# Patient Record
Sex: FEMALE | Race: BLACK OR AFRICAN AMERICAN | Employment: FULL TIME | URBAN - METROPOLITAN AREA
[De-identification: names, ages, dates, MRNs, and addresses within clinical notes are randomized per-mention and may not be internally consistent; named-entity substitution may affect disease eponyms.]

---

## 2018-08-16 ENCOUNTER — HOSPITAL ENCOUNTER (EMERGENCY)
Age: 38
Discharge: HOME | End: 2018-08-16
Attending: EMERGENCY MEDICINE
Payer: MEDICAID

## 2018-08-16 ENCOUNTER — APPOINTMENT (OUTPATIENT)
Dept: ULTRASOUND IMAGING | Age: 38
End: 2018-08-16
Payer: MEDICAID

## 2018-08-16 ENCOUNTER — APPOINTMENT (OUTPATIENT)
Dept: CT IMAGING | Age: 38
End: 2018-08-16
Payer: MEDICAID

## 2018-08-16 ENCOUNTER — APPOINTMENT (OUTPATIENT)
Dept: GENERAL RADIOLOGY | Age: 38
End: 2018-08-16
Payer: MEDICAID

## 2018-08-16 VITALS
TEMPERATURE: 98.6 F | HEIGHT: 66 IN | HEART RATE: 73 BPM | DIASTOLIC BLOOD PRESSURE: 84 MMHG | RESPIRATION RATE: 14 BRPM | OXYGEN SATURATION: 98 % | BODY MASS INDEX: 37.45 KG/M2 | WEIGHT: 233 LBS | SYSTOLIC BLOOD PRESSURE: 132 MMHG

## 2018-08-16 DIAGNOSIS — R60.9 PERIPHERAL EDEMA: ICD-10-CM

## 2018-08-16 DIAGNOSIS — M79.89 LEG SWELLING: Primary | ICD-10-CM

## 2018-08-16 LAB
ALBUMIN SERPL-MCNC: 3.9 G/DL (ref 3.5–5.2)
ALP BLD-CCNC: 54 U/L (ref 35–104)
ALT SERPL-CCNC: 12 U/L (ref 0–32)
ANION GAP SERPL CALCULATED.3IONS-SCNC: 10 MMOL/L (ref 7–16)
AST SERPL-CCNC: 26 U/L (ref 0–31)
BASOPHILS ABSOLUTE: 0.03 E9/L (ref 0–0.2)
BASOPHILS RELATIVE PERCENT: 0.5 % (ref 0–2)
BILIRUB SERPL-MCNC: 0.6 MG/DL (ref 0–1.2)
BUN BLDV-MCNC: 15 MG/DL (ref 6–20)
CALCIUM SERPL-MCNC: 8.8 MG/DL (ref 8.6–10.2)
CHLORIDE BLD-SCNC: 105 MMOL/L (ref 98–107)
CO2: 21 MMOL/L (ref 22–29)
CREAT SERPL-MCNC: 0.7 MG/DL (ref 0.5–1)
EOSINOPHILS ABSOLUTE: 0.14 E9/L (ref 0.05–0.5)
EOSINOPHILS RELATIVE PERCENT: 2.2 % (ref 0–6)
GFR AFRICAN AMERICAN: >60
GFR NON-AFRICAN AMERICAN: >60 ML/MIN/1.73
GLUCOSE BLD-MCNC: 80 MG/DL (ref 74–109)
HCT VFR BLD CALC: 36.5 % (ref 34–48)
HEMOGLOBIN: 11.6 G/DL (ref 11.5–15.5)
IMMATURE GRANULOCYTES #: 0.01 E9/L
IMMATURE GRANULOCYTES %: 0.2 % (ref 0–5)
LYMPHOCYTES ABSOLUTE: 2.56 E9/L (ref 1.5–4)
LYMPHOCYTES RELATIVE PERCENT: 40.8 % (ref 20–42)
MCH RBC QN AUTO: 25.4 PG (ref 26–35)
MCHC RBC AUTO-ENTMCNC: 31.8 % (ref 32–34.5)
MCV RBC AUTO: 79.9 FL (ref 80–99.9)
MONOCYTES ABSOLUTE: 0.73 E9/L (ref 0.1–0.95)
MONOCYTES RELATIVE PERCENT: 11.6 % (ref 2–12)
NEUTROPHILS ABSOLUTE: 2.81 E9/L (ref 1.8–7.3)
NEUTROPHILS RELATIVE PERCENT: 44.7 % (ref 43–80)
PDW BLD-RTO: 14.6 FL (ref 11.5–15)
PLATELET # BLD: 302 E9/L (ref 130–450)
PMV BLD AUTO: 9.4 FL (ref 7–12)
POTASSIUM SERPL-SCNC: 3.9 MMOL/L (ref 3.5–5)
RBC # BLD: 4.57 E12/L (ref 3.5–5.5)
SODIUM BLD-SCNC: 136 MMOL/L (ref 132–146)
TOTAL PROTEIN: 7.3 G/DL (ref 6.4–8.3)
WBC # BLD: 6.3 E9/L (ref 4.5–11.5)

## 2018-08-16 PROCEDURE — 72131 CT LUMBAR SPINE W/O DYE: CPT

## 2018-08-16 PROCEDURE — 72100 X-RAY EXAM L-S SPINE 2/3 VWS: CPT

## 2018-08-16 PROCEDURE — 80053 COMPREHEN METABOLIC PANEL: CPT

## 2018-08-16 PROCEDURE — 71046 X-RAY EXAM CHEST 2 VIEWS: CPT

## 2018-08-16 PROCEDURE — 99284 EMERGENCY DEPT VISIT MOD MDM: CPT

## 2018-08-16 PROCEDURE — 93971 EXTREMITY STUDY: CPT

## 2018-08-16 PROCEDURE — 85025 COMPLETE CBC W/AUTO DIFF WBC: CPT

## 2018-08-16 RX ORDER — SULFAMETHOXAZOLE AND TRIMETHOPRIM 800; 160 MG/1; MG/1
1 TABLET ORAL 2 TIMES DAILY
Qty: 20 TABLET | Refills: 0 | Status: SHIPPED | OUTPATIENT
Start: 2018-08-16 | End: 2018-08-26

## 2018-08-16 RX ORDER — IBUPROFEN 800 MG/1
800 TABLET ORAL EVERY 6 HOURS PRN
COMMUNITY
End: 2018-10-29

## 2018-08-16 RX ORDER — FEXOFENADINE HYDROCHLORIDE 60 MG/1
60 TABLET, FILM COATED ORAL DAILY
COMMUNITY
End: 2018-10-29

## 2018-08-16 ASSESSMENT — PAIN SCALES - GENERAL: PAINLEVEL_OUTOF10: 10

## 2018-08-16 NOTE — ED PROVIDER NOTES
fracture or dislocation. 2. No osseous central canal stenosis. If clinically warranted, MRI   could be helpful for assessment of lumbar spondylosis. XR CHEST STANDARD (2 VW)   Final Result   No sign of acute pulmonary process. US DUP LOWER EXTREMITY RIGHT JOSESITO   Final Result      No evidence to suggest deep venous thrombosis of the right lower   extremity. XR LUMBAR SPINE (2-3 VIEWS)   Final Result   Findings consistent with degenerative disc disease and   degenerative facets disease   Lytic lesions within the vertebral bodies concerning for metastatic   disease. MRI is suggested. ALERT:  THIS IS AN ABNORMAL REPORT                ------------------------- NURSING NOTES AND VITALS REVIEWED ---------------------------   The nursing notes within the ED encounter and vital signs as below have been reviewed. /84   Pulse 73   Temp 98.6 °F (37 °C) (Oral)   Resp 14   Ht 5' 6\" (1.676 m)   Wt 233 lb (105.7 kg)   SpO2 98%   BMI 37.61 kg/m²   Oxygen Saturation Interpretation: Normal      ---------------------------------------------------PHYSICAL EXAM--------------------------------------      Constitutional/General: Alert and oriented x3, well appearing, non toxic in NAD  Head: NC/AT  Eyes: PERRL, EOMI  Mouth: Oropharynx clear, handling secretions, no trismus  Neck: Supple, full ROM, no meningeal signs  Pulmonary: Lungs clear to auscultation bilaterally, no wheezes, rales, or rhonchi. Not in respiratory distress  Cardiovascular:  Regular rate and rhythm, no murmurs, gallops, or rubs. 2+ distal pulses  Extremities: Moves all extremities x 4. Minimal swelling noted in the ankles and lower extremities. I really is not very impressive. There certainly no pitting edema. She has mild tenderness in the right calf. No redness or visible rash. Exam of the lumbar spine shows no rash or redness. She is not tender to palpation in the lumbar region or right sciatic notch.  Negative straight

## 2018-09-20 ENCOUNTER — OFFICE VISIT (OUTPATIENT)
Dept: FAMILY MEDICINE CLINIC | Age: 38
End: 2018-09-20
Payer: COMMERCIAL

## 2018-09-20 VITALS
BODY MASS INDEX: 39.21 KG/M2 | HEIGHT: 66 IN | WEIGHT: 244 LBS | HEART RATE: 87 BPM | DIASTOLIC BLOOD PRESSURE: 84 MMHG | RESPIRATION RATE: 16 BRPM | SYSTOLIC BLOOD PRESSURE: 131 MMHG

## 2018-09-20 DIAGNOSIS — Z72.89 OTHER PROBLEMS RELATED TO LIFESTYLE: ICD-10-CM

## 2018-09-20 DIAGNOSIS — S93.492A SPRAIN OF ANTERIOR TALOFIBULAR LIGAMENT OF LEFT ANKLE, INITIAL ENCOUNTER: ICD-10-CM

## 2018-09-20 DIAGNOSIS — E78.5 HYPERLIPIDEMIA, UNSPECIFIED HYPERLIPIDEMIA TYPE: ICD-10-CM

## 2018-09-20 DIAGNOSIS — B96.89 ACUTE BACTERIAL SINUSITIS: Primary | ICD-10-CM

## 2018-09-20 DIAGNOSIS — J01.90 ACUTE BACTERIAL SINUSITIS: Primary | ICD-10-CM

## 2018-09-20 DIAGNOSIS — Z91.09 ENVIRONMENTAL ALLERGIES: ICD-10-CM

## 2018-09-20 DIAGNOSIS — R73.01 IMPAIRED FASTING GLUCOSE: ICD-10-CM

## 2018-09-20 PROCEDURE — G8427 DOCREV CUR MEDS BY ELIG CLIN: HCPCS | Performed by: FAMILY MEDICINE

## 2018-09-20 PROCEDURE — G8417 CALC BMI ABV UP PARAM F/U: HCPCS | Performed by: FAMILY MEDICINE

## 2018-09-20 PROCEDURE — 99204 OFFICE O/P NEW MOD 45 MIN: CPT | Performed by: FAMILY MEDICINE

## 2018-09-20 PROCEDURE — 1036F TOBACCO NON-USER: CPT | Performed by: FAMILY MEDICINE

## 2018-09-20 RX ORDER — CEFDINIR 300 MG/1
300 CAPSULE ORAL 2 TIMES DAILY
Qty: 20 CAPSULE | Refills: 0 | Status: SHIPPED | OUTPATIENT
Start: 2018-09-20 | End: 2018-09-30

## 2018-09-20 RX ORDER — MONTELUKAST SODIUM 10 MG/1
10 TABLET ORAL DAILY
Qty: 30 TABLET | Refills: 3 | Status: SHIPPED | OUTPATIENT
Start: 2018-09-20 | End: 2018-10-29

## 2018-09-20 RX ORDER — MONTELUKAST SODIUM 10 MG/1
10 TABLET ORAL DAILY
Qty: 30 TABLET | Refills: 3 | Status: SHIPPED | OUTPATIENT
Start: 2018-09-20 | End: 2018-09-20 | Stop reason: SDUPTHER

## 2018-09-20 ASSESSMENT — ENCOUNTER SYMPTOMS
SINUS PRESSURE: 1
BACK PAIN: 0
SORE THROAT: 1
COUGH: 0
DIARRHEA: 0
SHORTNESS OF BREATH: 0
ABDOMINAL PAIN: 0
CONSTIPATION: 0
EYE PAIN: 0
SINUS PAIN: 1

## 2018-09-20 ASSESSMENT — PATIENT HEALTH QUESTIONNAIRE - PHQ9
SUM OF ALL RESPONSES TO PHQ9 QUESTIONS 1 & 2: 0
SUM OF ALL RESPONSES TO PHQ QUESTIONS 1-9: 0
SUM OF ALL RESPONSES TO PHQ QUESTIONS 1-9: 0
2. FEELING DOWN, DEPRESSED OR HOPELESS: 0
1. LITTLE INTEREST OR PLEASURE IN DOING THINGS: 0

## 2018-09-20 NOTE — PROGRESS NOTES
Effort normal and breath sounds normal. She has no wheezes. Abdominal: Soft. She exhibits no mass. There is no tenderness. There is no rebound and no guarding. Musculoskeletal: Normal range of motion. She exhibits edema (Left lateral ankle over the ATFL) and tenderness (Over the ATFL). Lymphadenopathy:     She has no cervical adenopathy. Neurological: She is alert and oriented to person, place, and time. She has normal reflexes. Skin: Skin is warm and dry. No rash noted. Psychiatric: She has a normal mood and affect. Her behavior is normal.   Nursing note and vitals reviewed. Laboratory/Imaging: All laboratory and radiology results have been personally reviewed by myself      ASSESSMENT/PLAN:     Diagnosis Orders   1. Acute bacterial sinusitis  We'll treat with antibiotic. Side effects of medication reviewed patient. Likely also is allergy related as they recently moved here from [facility]. If not better by Monday they will call. 2. Environmental allergies  Singulair was sent into the pharmacy. Also recommended to use over-the-counter Zyrtec and/or Allegra. Patient is agreeable. 3. Sprain of anterior talofibular ligament of left ankle, initial encounter  Recommended exercises as well as compression and rice therapy. 4. Impaired fasting glucose  Does have a history will check A1c and CMP. HEMOGLOBIN A1C    Comprehensive Metabolic Panel     6. Hyperlipidemia, unspecified hyperlipidemia type  Not on medication currently will check the below lab  Lipid Panel   7. Other problems related to lifestyle  HIV Screen       HM reviewed today and updated as appropriate  Call or go to ED immediately if symptoms worsen or persist.  Future Appointments  Date Time Provider Valentin Santamaria   12/21/2018 9:00 AM DO Daisy Hassan [facility]     Or sooner if necessary.       Educational materials and/or home exercises printed for patient's review and were included in patient instructions on his/her After Visit Summary and given to patient at the end of visit.       Counseled regarding above diagnosis, including possible risks and complications,  especially if left uncontrolled.     Counseled regarding the possible side effects, risks, benefits and alternatives to treatment; patient and/or guardian verbalizes understanding, agrees, feels comfortable with and wishes to proceed with above treatment plan.     Advised patient to call with any new medication issues, and read all Rx info from pharmacy to assure aware of all possible risks and side effects of medication before taking.     Reviewed age and gender appropriate health screening exams and vaccinations. Advised patient regarding importance of keeping up with recommended health maintenance and to schedule as soon as possible if overdue, as this is important in assessing for undiagnosed pathology, especially cancer, as well as protecting against potentially harmful/life threatening disease.          Patient and/or guardian verbalizes understanding and agrees with above counseling, assessment and plan.     All questions answered. Ez Guevara, [address], DO  9/21/2018        NOTE: This report was transcribed using voice recognition software.  Every effort was made to ensure accuracy; however, inadvertent computerized transcription errors may be present

## 2018-09-21 ENCOUNTER — HOSPITAL ENCOUNTER (OUTPATIENT)
Age: 38
Discharge: HOME | End: 2018-09-21
Payer: COMMERCIAL

## 2018-09-21 DIAGNOSIS — Z72.89 OTHER PROBLEMS RELATED TO LIFESTYLE: ICD-10-CM

## 2018-09-21 DIAGNOSIS — E78.5 HYPERLIPIDEMIA, UNSPECIFIED HYPERLIPIDEMIA TYPE: ICD-10-CM

## 2018-09-21 DIAGNOSIS — R73.01 IMPAIRED FASTING GLUCOSE: ICD-10-CM

## 2018-09-21 PROBLEM — Z91.09 ENVIRONMENTAL ALLERGIES: Status: ACTIVE | Noted: 2018-09-21

## 2018-09-21 LAB
ALBUMIN SERPL-MCNC: 3.8 G/DL (ref 3.5–5.2)
ALP BLD-CCNC: 50 U/L (ref 35–104)
ALT SERPL-CCNC: 12 U/L (ref 0–32)
ANION GAP SERPL CALCULATED.3IONS-SCNC: 10 MMOL/L (ref 7–16)
AST SERPL-CCNC: 22 U/L (ref 0–31)
BILIRUB SERPL-MCNC: 0.6 MG/DL (ref 0–1.2)
BUN BLDV-MCNC: 13 MG/DL (ref 6–20)
CALCIUM SERPL-MCNC: 9.1 MG/DL (ref 8.6–10.2)
CHLORIDE BLD-SCNC: 105 MMOL/L (ref 98–107)
CHOLESTEROL, TOTAL: 142 MG/DL (ref 0–199)
CO2: 25 MMOL/L (ref 22–29)
CREAT SERPL-MCNC: 0.8 MG/DL (ref 0.5–1)
GFR AFRICAN AMERICAN: >60
GFR NON-AFRICAN AMERICAN: >60 ML/MIN/1.73
GLUCOSE BLD-MCNC: 89 MG/DL (ref 74–109)
HBA1C MFR BLD: 5.1 % (ref 4–5.6)
HDLC SERPL-MCNC: 54 MG/DL
LDL CHOLESTEROL CALCULATED: 81 MG/DL (ref 0–99)
POTASSIUM SERPL-SCNC: 4 MMOL/L (ref 3.5–5)
SODIUM BLD-SCNC: 140 MMOL/L (ref 132–146)
TOTAL PROTEIN: 7.1 G/DL (ref 6.4–8.3)
TRIGL SERPL-MCNC: 37 MG/DL (ref 0–149)
VLDLC SERPL CALC-MCNC: 7 MG/DL

## 2018-09-21 PROCEDURE — 86703 HIV-1/HIV-2 1 RESULT ANTBDY: CPT

## 2018-09-21 PROCEDURE — 83036 HEMOGLOBIN GLYCOSYLATED A1C: CPT

## 2018-09-21 PROCEDURE — 80061 LIPID PANEL: CPT

## 2018-09-21 PROCEDURE — 36415 COLL VENOUS BLD VENIPUNCTURE: CPT

## 2018-09-21 PROCEDURE — 80053 COMPREHEN METABOLIC PANEL: CPT

## 2018-09-24 LAB — HIV-1 AND HIV-2 ANTIBODIES: NORMAL

## 2018-10-04 ENCOUNTER — TELEPHONE (OUTPATIENT)
Dept: FAMILY MEDICINE CLINIC | Age: 38
End: 2018-10-04

## 2018-10-04 DIAGNOSIS — M25.572 LEFT ANKLE PAIN, UNSPECIFIED CHRONICITY: Primary | ICD-10-CM

## 2018-10-04 NOTE — TELEPHONE ENCOUNTER
Pt calling for a referral to [facility], ortho, for her ankle. Please let pt know if referral is placed.  Contact # [phone]

## 2018-10-26 ENCOUNTER — OFFICE VISIT (OUTPATIENT)
Dept: FAMILY MEDICINE CLINIC | Age: 38
End: 2018-10-26
Payer: COMMERCIAL

## 2018-10-26 ENCOUNTER — HOSPITAL ENCOUNTER (OUTPATIENT)
Age: 38
Discharge: HOME | End: 2018-10-28
Payer: COMMERCIAL

## 2018-10-26 VITALS
SYSTOLIC BLOOD PRESSURE: 136 MMHG | BODY MASS INDEX: 41.46 KG/M2 | TEMPERATURE: 99.4 F | OXYGEN SATURATION: 98 % | HEIGHT: 63 IN | WEIGHT: 234 LBS | RESPIRATION RATE: 14 BRPM | DIASTOLIC BLOOD PRESSURE: 84 MMHG | HEART RATE: 74 BPM

## 2018-10-26 DIAGNOSIS — K62.5 RECTAL BLEEDING: ICD-10-CM

## 2018-10-26 DIAGNOSIS — Z01.419 WELL WOMAN EXAM WITH ROUTINE GYNECOLOGICAL EXAM: Primary | ICD-10-CM

## 2018-10-26 PROCEDURE — 99214 OFFICE O/P EST MOD 30 MIN: CPT | Performed by: FAMILY MEDICINE

## 2018-10-26 PROCEDURE — 1036F TOBACCO NON-USER: CPT | Performed by: FAMILY MEDICINE

## 2018-10-26 PROCEDURE — 87624 HPV HI-RISK TYP POOLED RSLT: CPT

## 2018-10-26 PROCEDURE — G8484 FLU IMMUNIZE NO ADMIN: HCPCS | Performed by: FAMILY MEDICINE

## 2018-10-26 PROCEDURE — G8427 DOCREV CUR MEDS BY ELIG CLIN: HCPCS | Performed by: FAMILY MEDICINE

## 2018-10-26 PROCEDURE — G8417 CALC BMI ABV UP PARAM F/U: HCPCS | Performed by: FAMILY MEDICINE

## 2018-10-26 PROCEDURE — 88175 CYTOPATH C/V AUTO FLUID REDO: CPT

## 2018-10-26 NOTE — PROGRESS NOTES
Chiquita Madrigal  : 1980    Chief Complaint:     Chief Complaint   Patient presents with    Gynecologic Exam    Rectal Bleeding    Health Maintenance     refused all vaccines       HPI  Chiquita Madrigal 43 y.o. presents for   Chief Complaint   Patient presents with    Gynecologic Exam    Rectal Bleeding    Health Maintenance     refused all vaccines     Patient presents today for pap. She has had one abnormal pap in the past but none on many years. She denies any vaginal discharge/bleeding. menstrual cycles are normal with small amount of cramping. She denies any pain with intercourse. She does admit to some rectal bleeding when she is on her menstrual cycles. She states she notes clots after a BM while on her menstrual cycle. She did have a hemorrhoid in the past. She denies any fatigue, dizziness, lightheadedness. The patient was informed and offered a chaperone for a portion of the exam per [facility] Physician policy. The patient declined. Exam completed without chaperone present. All questions were answered to patients satisfaction. History reviewed. No pertinent past medical history. Past Surgical History:   Procedure Laterality Date    TUBAL LIGATION         Social History     Social History    Marital status: Single     Spouse name: N/A    Number of children: N/A    Years of education: N/A     Social History Main Topics    Smoking status: Never Smoker    Smokeless tobacco: Never Used    Alcohol use No    Drug use: No    Sexual activity: Not Asked     Other Topics Concern    None     Social History Narrative    None       History reviewed. No pertinent family history. No current outpatient prescriptions on file. No current facility-administered medications for this visit.         No Known Allergies    Health Maintenance Due   Topic Date Due    DTaP/Tdap/Td vaccine (1 - Tdap) 1998    Cervical cancer screen  2000    Flu vaccine (1) 2018

## 2018-10-29 ASSESSMENT — ENCOUNTER SYMPTOMS
BLOOD IN STOOL: 1
ABDOMINAL PAIN: 0
BACK PAIN: 0
DIARRHEA: 0
SORE THROAT: 0
CONSTIPATION: 0
SINUS PRESSURE: 0
SHORTNESS OF BREATH: 0
EYE PAIN: 0
COUGH: 0

## 2018-11-07 LAB
CORRESPONDING PAP CASE #: NORMAL
HPV, HIGH RISK: POSITIVE

## 2019-01-02 ENCOUNTER — HOSPITAL ENCOUNTER (OUTPATIENT)
Age: 39
Discharge: HOME | End: 2019-01-04
Payer: COMMERCIAL

## 2019-01-02 ENCOUNTER — OFFICE VISIT (OUTPATIENT)
Dept: FAMILY MEDICINE CLINIC | Age: 39
End: 2019-01-02
Payer: COMMERCIAL

## 2019-01-02 VITALS
WEIGHT: 230 LBS | BODY MASS INDEX: 36.96 KG/M2 | RESPIRATION RATE: 16 BRPM | SYSTOLIC BLOOD PRESSURE: 133 MMHG | TEMPERATURE: 97.8 F | DIASTOLIC BLOOD PRESSURE: 89 MMHG | HEIGHT: 66 IN | HEART RATE: 80 BPM | OXYGEN SATURATION: 99 %

## 2019-01-02 DIAGNOSIS — N30.00 ACUTE CYSTITIS WITHOUT HEMATURIA: ICD-10-CM

## 2019-01-02 DIAGNOSIS — Z12.4 CERVICAL CANCER SCREENING: Primary | ICD-10-CM

## 2019-01-02 DIAGNOSIS — R30.0 DYSURIA: ICD-10-CM

## 2019-01-02 LAB
BILIRUBIN, POC: NORMAL
BLOOD URINE, POC: NORMAL
CLARITY, POC: NORMAL
COLOR, POC: YELLOW
GLUCOSE URINE, POC: NORMAL
KETONES, POC: NORMAL
LEUKOCYTE EST, POC: NORMAL
NITRITE, POC: NORMAL
PH, POC: 5.5
PROTEIN, POC: NORMAL
SPECIFIC GRAVITY, POC: 1.01
UROBILINOGEN, POC: 0.2

## 2019-01-02 PROCEDURE — 88175 CYTOPATH C/V AUTO FLUID REDO: CPT

## 2019-01-02 PROCEDURE — 99214 OFFICE O/P EST MOD 30 MIN: CPT | Performed by: FAMILY MEDICINE

## 2019-01-02 PROCEDURE — 1036F TOBACCO NON-USER: CPT | Performed by: FAMILY MEDICINE

## 2019-01-02 PROCEDURE — 81002 URINALYSIS NONAUTO W/O SCOPE: CPT | Performed by: FAMILY MEDICINE

## 2019-01-02 PROCEDURE — 87088 URINE BACTERIA CULTURE: CPT

## 2019-01-02 PROCEDURE — G8427 DOCREV CUR MEDS BY ELIG CLIN: HCPCS | Performed by: FAMILY MEDICINE

## 2019-01-02 PROCEDURE — G8417 CALC BMI ABV UP PARAM F/U: HCPCS | Performed by: FAMILY MEDICINE

## 2019-01-02 PROCEDURE — G8484 FLU IMMUNIZE NO ADMIN: HCPCS | Performed by: FAMILY MEDICINE

## 2019-01-02 RX ORDER — NITROFURANTOIN 25; 75 MG/1; MG/1
100 CAPSULE ORAL 2 TIMES DAILY
Qty: 10 CAPSULE | Refills: 0 | Status: SHIPPED | OUTPATIENT
Start: 2019-01-02 | End: 2019-01-07

## 2019-01-02 ASSESSMENT — ENCOUNTER SYMPTOMS
CONSTIPATION: 0
BACK PAIN: 0
DIARRHEA: 0
ABDOMINAL PAIN: 0
SINUS PRESSURE: 0
COUGH: 0
EYE PAIN: 0
SHORTNESS OF BREATH: 0
SORE THROAT: 0

## 2019-01-04 LAB — URINE CULTURE, ROUTINE: NORMAL

## 2019-04-23 ENCOUNTER — HOSPITAL ENCOUNTER (EMERGENCY)
Age: 39
Discharge: HOME | End: 2019-04-23
Payer: COMMERCIAL

## 2019-04-23 ENCOUNTER — APPOINTMENT (OUTPATIENT)
Dept: CT IMAGING | Age: 39
End: 2019-04-23
Payer: COMMERCIAL

## 2019-04-23 VITALS
OXYGEN SATURATION: 98 % | WEIGHT: 242.13 LBS | RESPIRATION RATE: 18 BRPM | TEMPERATURE: 98 F | HEART RATE: 59 BPM | HEIGHT: 66 IN | DIASTOLIC BLOOD PRESSURE: 71 MMHG | SYSTOLIC BLOOD PRESSURE: 134 MMHG | BODY MASS INDEX: 38.91 KG/M2

## 2019-04-23 DIAGNOSIS — E04.9 THYROID ENLARGEMENT: Primary | ICD-10-CM

## 2019-04-23 DIAGNOSIS — J39.8 TRACHEAL DEVIATION: ICD-10-CM

## 2019-04-23 LAB
ALBUMIN SERPL-MCNC: 3.9 G/DL (ref 3.5–5.2)
ALP BLD-CCNC: 56 U/L (ref 35–104)
ALT SERPL-CCNC: 6 U/L (ref 0–32)
ANION GAP SERPL CALCULATED.3IONS-SCNC: 9 MMOL/L (ref 7–16)
AST SERPL-CCNC: 21 U/L (ref 0–31)
BASOPHILS ABSOLUTE: 0.03 E9/L (ref 0–0.2)
BASOPHILS RELATIVE PERCENT: 0.6 % (ref 0–2)
BILIRUB SERPL-MCNC: <0.2 MG/DL (ref 0–1.2)
BUN BLDV-MCNC: 14 MG/DL (ref 6–20)
CALCIUM SERPL-MCNC: 8.8 MG/DL (ref 8.6–10.2)
CHLORIDE BLD-SCNC: 105 MMOL/L (ref 98–107)
CO2: 26 MMOL/L (ref 22–29)
CO2: 27 MMOL/L (ref 22–29)
CREAT SERPL-MCNC: 0.8 MG/DL (ref 0.5–1)
EOSINOPHILS ABSOLUTE: 0.11 E9/L (ref 0.05–0.5)
EOSINOPHILS RELATIVE PERCENT: 2 % (ref 0–6)
GFR AFRICAN AMERICAN: >60
GFR AFRICAN AMERICAN: >60
GFR NON-AFRICAN AMERICAN: >60 ML/MIN/1.73
GFR NON-AFRICAN AMERICAN: >60 ML/MIN/1.73
GLUCOSE BLD-MCNC: 88 MG/DL (ref 74–99)
GLUCOSE BLD-MCNC: 92 MG/DL (ref 74–99)
HCT VFR BLD CALC: 38.2 % (ref 34–48)
HEMOGLOBIN: 11.9 G/DL (ref 11.5–15.5)
IMMATURE GRANULOCYTES #: 0.02 E9/L
IMMATURE GRANULOCYTES %: 0.4 % (ref 0–5)
LACTIC ACID: 0.6 MMOL/L (ref 0.5–2.2)
LYMPHOCYTES ABSOLUTE: 2.09 E9/L (ref 1.5–4)
LYMPHOCYTES RELATIVE PERCENT: 38.4 % (ref 20–42)
MCH RBC QN AUTO: 24.8 PG (ref 26–35)
MCHC RBC AUTO-ENTMCNC: 31.2 % (ref 32–34.5)
MCV RBC AUTO: 79.6 FL (ref 80–99.9)
MONO TEST: NEGATIVE
MONOCYTES ABSOLUTE: 0.53 E9/L (ref 0.1–0.95)
MONOCYTES RELATIVE PERCENT: 9.7 % (ref 2–12)
NEUTROPHILS ABSOLUTE: 2.66 E9/L (ref 1.8–7.3)
NEUTROPHILS RELATIVE PERCENT: 48.9 % (ref 43–80)
PDW BLD-RTO: 14.1 FL (ref 11.5–15)
PLATELET # BLD: 296 E9/L (ref 130–450)
PMV BLD AUTO: 9.5 FL (ref 7–12)
POC ANION GAP: 8 MMOL/L (ref 7–16)
POC BUN: 16 MG/DL (ref 8–23)
POC CHLORIDE: 105 MMOL/L (ref 100–108)
POC CREATININE: 0.8 MG/DL (ref 0.5–1)
POC POTASSIUM: 4.3 MMOL/L (ref 3.5–5)
POC SODIUM: 140 MMOL/L (ref 132–146)
POTASSIUM SERPL-SCNC: 4.2 MMOL/L (ref 3.5–5)
RBC # BLD: 4.8 E12/L (ref 3.5–5.5)
SODIUM BLD-SCNC: 140 MMOL/L (ref 132–146)
STREP GRP A PCR: NEGATIVE
T4 TOTAL: 6.4 MCG/DL (ref 4.5–11.7)
TOTAL PROTEIN: 7 G/DL (ref 6.4–8.3)
TSH SERPL DL<=0.05 MIU/L-ACNC: 1.29 UIU/ML (ref 0.27–4.2)
WBC # BLD: 5.4 E9/L (ref 4.5–11.5)

## 2019-04-23 PROCEDURE — 86308 HETEROPHILE ANTIBODY SCREEN: CPT

## 2019-04-23 PROCEDURE — 86703 HIV-1/HIV-2 1 RESULT ANTBDY: CPT

## 2019-04-23 PROCEDURE — 82947 ASSAY GLUCOSE BLOOD QUANT: CPT

## 2019-04-23 PROCEDURE — 84520 ASSAY OF UREA NITROGEN: CPT

## 2019-04-23 PROCEDURE — 6360000002 HC RX W HCPCS: Performed by: PHYSICIAN ASSISTANT

## 2019-04-23 PROCEDURE — 85025 COMPLETE CBC W/AUTO DIFF WBC: CPT

## 2019-04-23 PROCEDURE — 87880 STREP A ASSAY W/OPTIC: CPT

## 2019-04-23 PROCEDURE — 84443 ASSAY THYROID STIM HORMONE: CPT

## 2019-04-23 PROCEDURE — 80051 ELECTROLYTE PANEL: CPT

## 2019-04-23 PROCEDURE — 99283 EMERGENCY DEPT VISIT LOW MDM: CPT

## 2019-04-23 PROCEDURE — 70491 CT SOFT TISSUE NECK W/DYE: CPT

## 2019-04-23 PROCEDURE — 96374 THER/PROPH/DIAG INJ IV PUSH: CPT

## 2019-04-23 PROCEDURE — 82565 ASSAY OF CREATININE: CPT

## 2019-04-23 PROCEDURE — 83605 ASSAY OF LACTIC ACID: CPT

## 2019-04-23 PROCEDURE — 84436 ASSAY OF TOTAL THYROXINE: CPT

## 2019-04-23 PROCEDURE — 80053 COMPREHEN METABOLIC PANEL: CPT

## 2019-04-23 PROCEDURE — 6360000004 HC RX CONTRAST MEDICATION: Performed by: RADIOLOGY

## 2019-04-23 PROCEDURE — 36415 COLL VENOUS BLD VENIPUNCTURE: CPT

## 2019-04-23 RX ORDER — ONDANSETRON 2 MG/ML
4 INJECTION INTRAMUSCULAR; INTRAVENOUS ONCE
Status: DISCONTINUED | OUTPATIENT
Start: 2019-04-23 | End: 2019-04-23 | Stop reason: HOSPADM

## 2019-04-23 RX ORDER — KETOROLAC TROMETHAMINE 15 MG/ML
15 INJECTION, SOLUTION INTRAMUSCULAR; INTRAVENOUS ONCE
Status: COMPLETED | OUTPATIENT
Start: 2019-04-23 | End: 2019-04-23

## 2019-04-23 RX ADMIN — KETOROLAC TROMETHAMINE 15 MG: 15 INJECTION, SOLUTION INTRAMUSCULAR; INTRAVENOUS at 18:06

## 2019-04-23 RX ADMIN — IOPAMIDOL 80 ML: 755 INJECTION, SOLUTION INTRAVENOUS at 17:19

## 2019-04-23 ASSESSMENT — PAIN SCALES - GENERAL
PAINLEVEL_OUTOF10: 8
PAINLEVEL_OUTOF10: 6

## 2019-04-23 ASSESSMENT — PAIN DESCRIPTION - FREQUENCY: FREQUENCY: CONTINUOUS

## 2019-04-23 ASSESSMENT — PAIN DESCRIPTION - DESCRIPTORS: DESCRIPTORS: SORE

## 2019-04-23 ASSESSMENT — PAIN DESCRIPTION - PAIN TYPE: TYPE: ACUTE PAIN

## 2019-04-23 ASSESSMENT — PAIN DESCRIPTION - ONSET: ONSET: ON-GOING

## 2019-04-23 ASSESSMENT — PAIN DESCRIPTION - LOCATION: LOCATION: THROAT

## 2019-04-23 NOTE — ED PROVIDER NOTES
[facility]     Department of Emergency Medicine   ED  Provider Note  Admit Date/RoomTime: 4/23/2019  3:58 PM  ED Room: 20/20  Chief Complaint   Pharyngitis (sore throat today)    History of Present Illness   Source of history provided by:  patient. History/Exam Limitations: none. Don Segal is a 43 y.o. old female who has a past medical history of:   Past Medical History:   Diagnosis Date    Environmental allergies 9/21/2018    Hyperlipidemia 9/21/2018    Impaired fasting glucose 9/21/2018      presents to the emergency department by private vehicle, for right sided sore throat pain, which occured when she woke up today  prior to arrival. Associated Signs & Symptoms: chills, headache mild over the last 4 days and swelling to the right side of her neck Since onset the symptoms have been persistent and unchanged. She is drinking plenty of fluids. Patient also states that she had some diarrhea 2 days ago, but has had normal without bowel movement since that time and denies any nausea, vomiting, or abdominal pain. She denies any documented fevers, sweats, ear pain, nasal congestion, cough, or SOB/CP. Exposed To: Streptococcus: unknown. Infectious Mononucleosis:  unknown. Symptoms:  Pain:  Yes. Muffled Voice:  No.                            Hoarse:  No.                            Difficulty with Secretions:  No.    Centor Score (MODIFIED) For Strep Pharyngitis:    Age 3-14 Years   no (0)     Age >44 Years   NO     Exudate or Swelling on Tonsils   yes (1)     Tender/Swollen Anterior Cervical Nodes   yes (1)     Fever? (T > 38°C, 100.4°F)   no (0)     Absence of Cough   yes (1)   TOTAL POINTS   3   Score of Zero = Probability of Strep Pharyngitis: 1% - 2.5%. ,   No further testing nor antibiotics. Score of 1 = Probability of Strep Pharyngitis: 5% - 10%. ,   No further testing nor antibiotics.   Score of 2 = Probability of Strep Phar: 100 mL of Omnipaque 300      Final        ED Course / Medical Decision Making     Medications   ondansetron [facility]) injection 4 mg (4 mg Intravenous Not Given 4/23/19 1808)   iopamidol (ISOVUE-370) 76 % injection 80 mL (80 mLs Intravenous Given 4/23/19 1719)   ketorolac (TORADOL) injection 15 mg (15 mg Intravenous Given 4/23/19 1806)      Re-examination:  Time: 1800  Patient's symptoms have improved slightly. She denies any difficulty swallowing or breathing. No SOB. Consult(s):   None    Procedure(s):   none    MDM:   Patient presents to the emergency department for a sore throat and right sided throat swelling. CT was obtained and significant for enlarged thyroid with tracheal deviation. Despite these findings, the patient's vital signs are stable, and she denies any difficulty swallowing or breathing. The remainder of her diagnostic workup is unremarkable. She'll be given the contact information for Dr. Rosalva Gtz and should call their office tomorrow to schedule close follow-up appointment this week. Specific conditions for emergent return have been discussed and the patient verbalized understanding to return immediately for new or worsening symptoms. Counseling: The emergency provider has spoken with the patient and discussed todays results, in addition to providing specific details for the plan of care and counseling regarding the diagnosis and prognosis. Questions are answered at this time and they are agreeable with the plan. Assessment     1. Thyroid enlargement    2. Tracheal deviation       Plan   Discharge to home  Patient condition is good    New Medications     New Prescriptions    No medications on file     Electronically signed by Tarun Kauffman PA-C   DD: 4/23/19  **This report was transcribed using voice recognition software. Every effort was made to ensure accuracy; however, inadvertent computerized transcription errors may be present.   END OF ED PROVIDER NOTE        Priti Herrera Angel Juan PA-C  04/23/19 [id number]

## 2019-04-24 ENCOUNTER — TELEPHONE (OUTPATIENT)
Dept: ENT CLINIC | Age: 39
End: 2019-04-24

## 2019-04-24 LAB — HIV-1 AND HIV-2 ANTIBODIES: NORMAL

## 2019-04-24 NOTE — TELEPHONE ENCOUNTER
[place] [facility] [place] for rt sided throat pain, neg for strep. CT results:   1. No acute pharyngeal abnormality.    2. Very large thyroid gland ; right lobe substernal extension shifting   the trachea to the left.         Please call pt with appt info [phone]

## 2019-04-29 NOTE — TELEPHONE ENCOUNTER
Patient called back and stated she hasn't received a call back about scheduling and she is concerned. Please call her at [phone]. Thanks.

## 2019-05-07 ENCOUNTER — OFFICE VISIT (OUTPATIENT)
Dept: ENT CLINIC | Age: 39
End: 2019-05-07
Payer: COMMERCIAL

## 2019-05-07 VITALS
DIASTOLIC BLOOD PRESSURE: 65 MMHG | BODY MASS INDEX: 42.46 KG/M2 | HEIGHT: 64 IN | HEART RATE: 81 BPM | WEIGHT: 248.7 LBS | SYSTOLIC BLOOD PRESSURE: 114 MMHG

## 2019-05-07 DIAGNOSIS — E04.1 THYROID NODULE: Primary | ICD-10-CM

## 2019-05-07 DIAGNOSIS — E01.0 THYROMEGALY: ICD-10-CM

## 2019-05-07 PROCEDURE — G8427 DOCREV CUR MEDS BY ELIG CLIN: HCPCS | Performed by: OTOLARYNGOLOGY

## 2019-05-07 PROCEDURE — 1036F TOBACCO NON-USER: CPT | Performed by: OTOLARYNGOLOGY

## 2019-05-07 PROCEDURE — 99204 OFFICE O/P NEW MOD 45 MIN: CPT | Performed by: OTOLARYNGOLOGY

## 2019-05-07 PROCEDURE — G8417 CALC BMI ABV UP PARAM F/U: HCPCS | Performed by: OTOLARYNGOLOGY

## 2019-05-07 ASSESSMENT — ENCOUNTER SYMPTOMS
COLOR CHANGE: 0
SHORTNESS OF BREATH: 0
CHOKING: 1
VOICE CHANGE: 0
CHEST TIGHTNESS: 1
EYES NEGATIVE: 1
GASTROINTESTINAL NEGATIVE: 1
COUGH: 1
ABDOMINAL PAIN: 0

## 2019-05-07 NOTE — PATIENT INSTRUCTIONS
SURGERY:_____/_____/_____    Nothing to eat or drink after midnight the night before surgery unless surgery is at [facility] or otherwise instructed by the hospital.    DO NOT TAKE ANY ASPIRIN PRODUCTS 7 days prior to surgery. Tylenol only. No Advil, Motrin, Aleve, or Ibuprofen. Any illegal drugs in your system (including Marijuana even if legally prescribed) will result in your surgery being cancelled. Please be sure to check with our office or the hospital on time frame for the drugs to be out of your system. SHOULD YOUR INSURANCE CHANGE AT ANY TIME YOU MUST CONTACT OUR OFFICE. FAILURE TO DO SO MAY RESULT IN YOUR SURGERY BEING RESCHEDULED OR YOU MAY BE CHARGED AS SELF-PAY. The location of your surgery will call you a few days prior to your surgery date to let you know what time you have to be there and any other details. ·       [address] [facility], [address] [place] [address] will call you a couple days prior to surgery and give you further instructions, if you have any questions, you can reach them at [phone]    ·       [address] [facility], [place] will call you a couple days prior to surgery and give you further instructions, if you have any questions, you can reach them at [phone]    ·       [facility], [address],  [address] [facility], [address] [place] [address] will call you a couple days prior to surgery and give you further instructions, if you have any questions, you can reach them at [phone]    ·       [facility] [address] [facility] will call you a couple days prior to surgery and give you further instructions, if you have any questions, you can reach them at [phone]    ·      [address] [facility].  Noah Martinez will call you a couple days prior to surgery and give you further instructions, if you have any questions, you can reach them at [phone]        Pre-Surgery/Anesthesia Video ([address] [facility] [address] on [address] [place] [address] Avenue:   1. Scroll over Health Information   2. Select Audio and Video   3. Select [employer]   4. Select Your child and Anesthesia   5. Select Pre surgery [facility]      FOOD RESTRICTIONS--[facility] ONLY    Solid Food/Milk Products --------- Stop 8 hours prior to Surgery    Formula --------- Stop 6 hours prior to Surgery    Breast Milk ------- Stop 4 hours prior to Surgery    Clear liquids (water,Gatorade,Pedialyte) - Stop 2 hours prior to Surgery    I HAVE RECEIVED A COPY OF MY SURGERY INSTRUCTIONS AND WILL CONTACT THE OFFICE IF THERE SHOULD BE ANY CHANGES TO MY INFORMATION  Signature: __________________________________ Date: ____/____/____            Thank you for choosing our [facility]. We are committed to your medical treatment and care. To prepare you fro surgery, the surgery scheduler will be calling you to confirm a date for both your surgery and follow up appointmnet. Please allow at least 72 hours after your office visit to receive your appointment dates and times.     If you need to reschedule or cancel your surgery or follow up appointment,please call the surgery scheduler at [phone]

## 2019-05-07 NOTE — PROGRESS NOTES
Subjective:     Patient ID:  Mary Whitehead is a 43 y.o. female. HPI:    Thyroid  Patient presents for evaluation ofhypothyroidism and multiple thyroid nodules. Current symptoms include fatigue, weight gain, feeling cold and cold intolerance. Patient denies feeling excessive energy. Thyroid labwork - yes  Findings - no known disease    History of radiation? no  US? no  Results -none  FNA? no  Results - none      Patient's medications, allergies, past medical, surgical, social and family histories were reviewed and updated as appropriate. Patient's medications,allergies, past medical, surgical, social and family histories were reviewed andupdated as appropriate. Review of Systems   Constitutional: Negative. HENT: Positive for congestion. Negative for voice change. Eyes: Negative. Negative for visual disturbance. Respiratory: Positive for cough, choking and chest tightness. Negative for shortness of breath. Cardiovascular: Negative. Negative for chest pain. Gastrointestinal: Negative. Negative for abdominal pain. Genitourinary: Negative. Musculoskeletal: Negative. Skin: Negative. Negative for color change. Neurological: Negative. Psychiatric/Behavioral: Negative. Negative for behavioral problems and hallucinations. All other systems reviewed and are negative. Objective:   Physical Exam   Constitutional: She is oriented to person, place, and time. She appears well-developed and well-nourished. HENT:   Head: Normocephalic and atraumatic. Right Ear: Hearing, tympanic membrane, external ear and ear canal normal.   Left Ear: Hearing, tympanic membrane, external ear and ear canal normal.   Nose: Nose normal.   Mouth/Throat: Uvula is midline, oropharynx is clear and moist and mucous membranes are normal.   Eyes: Pupils are equal, round, and reactive to light. Conjunctivae and EOM are normal.   Neck: Thyroid mass and thyromegaly present.        Cardiovascular: Normal rate, regular rhythm and normal heart sounds. Pulmonary/Chest: Effort normal and breath sounds normal.   Abdominal: Soft. Bowel sounds are normal.   Neurological: She is alert and oriented to person, place, and time. Nursing note and vitals reviewed. CT neck:  FINDINGS:   The thyroid gland is very large. The right lobe is larger than the   left side and the right lobe extends into the substernal location   causing deviation of the trachea to the left. The density is   heterogeneous which may reflect underlying nodules or colloid cyst.   This can be best evaluated with ultrasound.       Retropharyngeal soft tissues appear normal. Eustachian tubes are   normal.       The parapharyngeal soft tissues are normal.       There is no enhancing fluid collection to suggest abscess. Epiglottis   appears normal.       No cervical adenopathy.       Normal enhancement of the vascular structures. Assessment:       Diagnosis Orders   1. Thyroid nodule               Plan:      I recommend:    total Thyroidectomy with nerve integrity monitor. I will keep the patient overnight for observation after surgery  The procedure risks and benefits were discussed with the patient and family including:    -- Injury to the recurrent laryngeal nerve can occur in 5% of cases. A temporary paralysis of the nerve also may occur if the nerve is stretched during surgery. This may happen if a superior approach is used to remove the gland and the nerve is stretched between where it enters the larynx and below the thyroid next to where it may be bound to Berry's ligament. -- Postoperative bleeding may occur around the trachea. If severe airway obstruction may occur. -- Asymmetry of the skin flaps which may cause a cosmetic deformity. If a total thyroidectomy is performed both recurrent laryngeal nerves are at risk. If both are injured, the patient will have a poor airway and placement of a tracheotomy may be necessary.

## 2019-06-26 RX ORDER — ACETAMINOPHEN 325 MG/1
650 TABLET ORAL EVERY 6 HOURS PRN
Status: ON HOLD | COMMUNITY
End: 2019-07-02 | Stop reason: HOSPADM

## 2019-06-30 ENCOUNTER — ANESTHESIA EVENT (OUTPATIENT)
Dept: OPERATING ROOM | Age: 39
End: 2019-06-30
Payer: COMMERCIAL

## 2019-07-01 ENCOUNTER — ANESTHESIA (OUTPATIENT)
Dept: OPERATING ROOM | Age: 39
End: 2019-07-01
Payer: COMMERCIAL

## 2019-07-01 ENCOUNTER — HOSPITAL ENCOUNTER (OUTPATIENT)
Age: 39
Setting detail: OBSERVATION
Discharge: HOME | End: 2019-07-02
Attending: OTOLARYNGOLOGY | Admitting: OTOLARYNGOLOGY
Payer: COMMERCIAL

## 2019-07-01 VITALS — DIASTOLIC BLOOD PRESSURE: 65 MMHG | SYSTOLIC BLOOD PRESSURE: 143 MMHG | OXYGEN SATURATION: 100 %

## 2019-07-01 DIAGNOSIS — G89.18 POST-OP PAIN: Primary | ICD-10-CM

## 2019-07-01 PROBLEM — E04.1 THYROID NODULE: Status: ACTIVE | Noted: 2019-07-01

## 2019-07-01 LAB
CALCIUM IONIZED: 1.18 MMOL/L (ref 1.15–1.33)
CALCIUM SERPL-MCNC: 8.2 MG/DL (ref 8.6–10.2)
HCG(URINE) PREGNANCY TEST: NEGATIVE

## 2019-07-01 PROCEDURE — 6360000002 HC RX W HCPCS: Performed by: NURSE ANESTHETIST, CERTIFIED REGISTERED

## 2019-07-01 PROCEDURE — 2500000003 HC RX 250 WO HCPCS: Performed by: OTOLARYNGOLOGY

## 2019-07-01 PROCEDURE — 6370000000 HC RX 637 (ALT 250 FOR IP): Performed by: OTOLARYNGOLOGY

## 2019-07-01 PROCEDURE — 2500000003 HC RX 250 WO HCPCS: Performed by: NURSE ANESTHETIST, CERTIFIED REGISTERED

## 2019-07-01 PROCEDURE — 83970 ASSAY OF PARATHORMONE: CPT

## 2019-07-01 PROCEDURE — 88307 TISSUE EXAM BY PATHOLOGIST: CPT

## 2019-07-01 PROCEDURE — 3700000001 HC ADD 15 MINUTES (ANESTHESIA): Performed by: OTOLARYNGOLOGY

## 2019-07-01 PROCEDURE — 82330 ASSAY OF CALCIUM: CPT

## 2019-07-01 PROCEDURE — 82310 ASSAY OF CALCIUM: CPT

## 2019-07-01 PROCEDURE — 3600000013 HC SURGERY LEVEL 3 ADDTL 15MIN: Performed by: OTOLARYNGOLOGY

## 2019-07-01 PROCEDURE — 6360000002 HC RX W HCPCS: Performed by: OTOLARYNGOLOGY

## 2019-07-01 PROCEDURE — 2580000003 HC RX 258: Performed by: NURSE ANESTHETIST, CERTIFIED REGISTERED

## 2019-07-01 PROCEDURE — 2720000010 HC SURG SUPPLY STERILE: Performed by: OTOLARYNGOLOGY

## 2019-07-01 PROCEDURE — 3600000003 HC SURGERY LEVEL 3 BASE: Performed by: OTOLARYNGOLOGY

## 2019-07-01 PROCEDURE — 7100000000 HC PACU RECOVERY - FIRST 15 MIN: Performed by: OTOLARYNGOLOGY

## 2019-07-01 PROCEDURE — 36415 COLL VENOUS BLD VENIPUNCTURE: CPT

## 2019-07-01 PROCEDURE — 81025 URINE PREGNANCY TEST: CPT

## 2019-07-01 PROCEDURE — 3700000000 HC ANESTHESIA ATTENDED CARE: Performed by: OTOLARYNGOLOGY

## 2019-07-01 PROCEDURE — 7100000001 HC PACU RECOVERY - ADDTL 15 MIN: Performed by: OTOLARYNGOLOGY

## 2019-07-01 PROCEDURE — 6360000002 HC RX W HCPCS: Performed by: ANESTHESIOLOGY

## 2019-07-01 PROCEDURE — 2709999900 HC NON-CHARGEABLE SUPPLY: Performed by: OTOLARYNGOLOGY

## 2019-07-01 RX ORDER — MORPHINE SULFATE 2 MG/ML
2 INJECTION, SOLUTION INTRAMUSCULAR; INTRAVENOUS
Status: DISCONTINUED | OUTPATIENT
Start: 2019-07-01 | End: 2019-07-02 | Stop reason: HOSPADM

## 2019-07-01 RX ORDER — DIAPER,BRIEF,INFANT-TODD,DISP
EACH MISCELLANEOUS 2 TIMES DAILY
Status: DISCONTINUED | OUTPATIENT
Start: 2019-07-01 | End: 2019-07-02 | Stop reason: HOSPADM

## 2019-07-01 RX ORDER — OXYCODONE HYDROCHLORIDE AND ACETAMINOPHEN 5; 325 MG/1; MG/1
1 TABLET ORAL
Status: DISCONTINUED | OUTPATIENT
Start: 2019-07-01 | End: 2019-07-01 | Stop reason: HOSPADM

## 2019-07-01 RX ORDER — PROPOFOL 10 MG/ML
INJECTION, EMULSION INTRAVENOUS PRN
Status: DISCONTINUED | OUTPATIENT
Start: 2019-07-01 | End: 2019-07-01 | Stop reason: SDUPTHER

## 2019-07-01 RX ORDER — ONDANSETRON 2 MG/ML
4 INJECTION INTRAMUSCULAR; INTRAVENOUS EVERY 6 HOURS PRN
Status: DISCONTINUED | OUTPATIENT
Start: 2019-07-01 | End: 2019-07-02 | Stop reason: HOSPADM

## 2019-07-01 RX ORDER — SODIUM CHLORIDE 0.9 % (FLUSH) 0.9 %
10 SYRINGE (ML) INJECTION PRN
Status: DISCONTINUED | OUTPATIENT
Start: 2019-07-01 | End: 2019-07-02 | Stop reason: HOSPADM

## 2019-07-01 RX ORDER — FENTANYL CITRATE 50 UG/ML
50 INJECTION, SOLUTION INTRAMUSCULAR; INTRAVENOUS EVERY 5 MIN PRN
Status: COMPLETED | OUTPATIENT
Start: 2019-07-01 | End: 2019-07-01

## 2019-07-01 RX ORDER — SODIUM CHLORIDE 0.9 % (FLUSH) 0.9 %
10 SYRINGE (ML) INJECTION PRN
Status: DISCONTINUED | OUTPATIENT
Start: 2019-07-01 | End: 2019-07-01 | Stop reason: HOSPADM

## 2019-07-01 RX ORDER — PROMETHAZINE HYDROCHLORIDE 25 MG/ML
6.25 INJECTION, SOLUTION INTRAMUSCULAR; INTRAVENOUS PRN
Status: DISCONTINUED | OUTPATIENT
Start: 2019-07-01 | End: 2019-07-01 | Stop reason: HOSPADM

## 2019-07-01 RX ORDER — SUCCINYLCHOLINE/SOD CL,ISO/PF 200MG/10ML
SYRINGE (ML) INTRAVENOUS PRN
Status: DISCONTINUED | OUTPATIENT
Start: 2019-07-01 | End: 2019-07-01 | Stop reason: SDUPTHER

## 2019-07-01 RX ORDER — MEPERIDINE HYDROCHLORIDE 25 MG/ML
12.5 INJECTION INTRAMUSCULAR; INTRAVENOUS; SUBCUTANEOUS EVERY 10 MIN PRN
Status: DISCONTINUED | OUTPATIENT
Start: 2019-07-01 | End: 2019-07-01 | Stop reason: HOSPADM

## 2019-07-01 RX ORDER — MIDAZOLAM HYDROCHLORIDE 1 MG/ML
INJECTION INTRAMUSCULAR; INTRAVENOUS PRN
Status: DISCONTINUED | OUTPATIENT
Start: 2019-07-01 | End: 2019-07-01 | Stop reason: SDUPTHER

## 2019-07-01 RX ORDER — SODIUM CHLORIDE, SODIUM LACTATE, POTASSIUM CHLORIDE, CALCIUM CHLORIDE 600; 310; 30; 20 MG/100ML; MG/100ML; MG/100ML; MG/100ML
INJECTION, SOLUTION INTRAVENOUS CONTINUOUS PRN
Status: DISCONTINUED | OUTPATIENT
Start: 2019-07-01 | End: 2019-07-01 | Stop reason: SDUPTHER

## 2019-07-01 RX ORDER — CEFAZOLIN SODIUM 1 G/3ML
INJECTION, POWDER, FOR SOLUTION INTRAMUSCULAR; INTRAVENOUS PRN
Status: DISCONTINUED | OUTPATIENT
Start: 2019-07-01 | End: 2019-07-01 | Stop reason: SDUPTHER

## 2019-07-01 RX ORDER — LIDOCAINE HYDROCHLORIDE 20 MG/ML
INJECTION, SOLUTION EPIDURAL; INFILTRATION; INTRACAUDAL; PERINEURAL PRN
Status: DISCONTINUED | OUTPATIENT
Start: 2019-07-01 | End: 2019-07-01 | Stop reason: SDUPTHER

## 2019-07-01 RX ORDER — FENTANYL CITRATE 50 UG/ML
INJECTION, SOLUTION INTRAMUSCULAR; INTRAVENOUS PRN
Status: DISCONTINUED | OUTPATIENT
Start: 2019-07-01 | End: 2019-07-01 | Stop reason: SDUPTHER

## 2019-07-01 RX ORDER — SODIUM CHLORIDE 9 MG/ML
INJECTION, SOLUTION INTRAVENOUS CONTINUOUS PRN
Status: DISCONTINUED | OUTPATIENT
Start: 2019-07-01 | End: 2019-07-01 | Stop reason: SDUPTHER

## 2019-07-01 RX ORDER — ONDANSETRON 2 MG/ML
INJECTION INTRAMUSCULAR; INTRAVENOUS PRN
Status: DISCONTINUED | OUTPATIENT
Start: 2019-07-01 | End: 2019-07-01 | Stop reason: SDUPTHER

## 2019-07-01 RX ORDER — SODIUM CHLORIDE 0.9 % (FLUSH) 0.9 %
10 SYRINGE (ML) INJECTION EVERY 12 HOURS SCHEDULED
Status: DISCONTINUED | OUTPATIENT
Start: 2019-07-01 | End: 2019-07-02 | Stop reason: HOSPADM

## 2019-07-01 RX ORDER — DIAPER,BRIEF,INFANT-TODD,DISP
EACH MISCELLANEOUS PRN
Status: DISCONTINUED | OUTPATIENT
Start: 2019-07-01 | End: 2019-07-01 | Stop reason: ALTCHOICE

## 2019-07-01 RX ORDER — LIDOCAINE HYDROCHLORIDE AND EPINEPHRINE 10; 10 MG/ML; UG/ML
INJECTION, SOLUTION INFILTRATION; PERINEURAL PRN
Status: DISCONTINUED | OUTPATIENT
Start: 2019-07-01 | End: 2019-07-01 | Stop reason: ALTCHOICE

## 2019-07-01 RX ORDER — DEXAMETHASONE SODIUM PHOSPHATE 4 MG/ML
INJECTION, SOLUTION INTRA-ARTICULAR; INTRALESIONAL; INTRAMUSCULAR; INTRAVENOUS; SOFT TISSUE PRN
Status: DISCONTINUED | OUTPATIENT
Start: 2019-07-01 | End: 2019-07-01 | Stop reason: SDUPTHER

## 2019-07-01 RX ORDER — SODIUM CHLORIDE 0.9 % (FLUSH) 0.9 %
10 SYRINGE (ML) INJECTION EVERY 12 HOURS SCHEDULED
Status: DISCONTINUED | OUTPATIENT
Start: 2019-07-01 | End: 2019-07-01 | Stop reason: HOSPADM

## 2019-07-01 RX ORDER — DEXTROSE, SODIUM CHLORIDE, AND POTASSIUM CHLORIDE 5; .45; .15 G/100ML; G/100ML; G/100ML
INJECTION INTRAVENOUS CONTINUOUS
Status: DISCONTINUED | OUTPATIENT
Start: 2019-07-01 | End: 2019-07-02 | Stop reason: HOSPADM

## 2019-07-01 RX ORDER — MORPHINE SULFATE 4 MG/ML
4 INJECTION, SOLUTION INTRAMUSCULAR; INTRAVENOUS
Status: DISCONTINUED | OUTPATIENT
Start: 2019-07-01 | End: 2019-07-02 | Stop reason: HOSPADM

## 2019-07-01 RX ADMIN — FENTANYL CITRATE 50 MCG: 50 INJECTION, SOLUTION INTRAMUSCULAR; INTRAVENOUS at 18:46

## 2019-07-01 RX ADMIN — DEXAMETHASONE SODIUM PHOSPHATE 10 MG: 4 INJECTION, SOLUTION INTRAMUSCULAR; INTRAVENOUS at 15:48

## 2019-07-01 RX ADMIN — LIDOCAINE HYDROCHLORIDE 100 MG: 20 INJECTION, SOLUTION EPIDURAL; INFILTRATION; INTRACAUDAL; PERINEURAL at 15:34

## 2019-07-01 RX ADMIN — FENTANYL CITRATE 50 MCG: 50 INJECTION INTRAMUSCULAR; INTRAVENOUS at 21:45

## 2019-07-01 RX ADMIN — PROPOFOL 50 MG: 10 INJECTION, EMULSION INTRAVENOUS at 19:46

## 2019-07-01 RX ADMIN — Medication 160 MG: at 15:34

## 2019-07-01 RX ADMIN — SODIUM CHLORIDE: 9 INJECTION, SOLUTION INTRAVENOUS at 15:30

## 2019-07-01 RX ADMIN — BACITRACIN: 500 OINTMENT TOPICAL at 23:09

## 2019-07-01 RX ADMIN — POTASSIUM CHLORIDE, DEXTROSE MONOHYDRATE AND SODIUM CHLORIDE: 150; 5; 450 INJECTION, SOLUTION INTRAVENOUS at 23:07

## 2019-07-01 RX ADMIN — Medication 2 G: at 15:30

## 2019-07-01 RX ADMIN — SODIUM CHLORIDE, POTASSIUM CHLORIDE, SODIUM LACTATE AND CALCIUM CHLORIDE: 600; 310; 30; 20 INJECTION, SOLUTION INTRAVENOUS at 19:03

## 2019-07-01 RX ADMIN — MIDAZOLAM HYDROCHLORIDE 2 MG: 1 INJECTION, SOLUTION INTRAMUSCULAR; INTRAVENOUS at 15:30

## 2019-07-01 RX ADMIN — FENTANYL CITRATE 50 MCG: 50 INJECTION, SOLUTION INTRAMUSCULAR; INTRAVENOUS at 17:44

## 2019-07-01 RX ADMIN — FENTANYL CITRATE 100 MCG: 50 INJECTION, SOLUTION INTRAMUSCULAR; INTRAVENOUS at 15:34

## 2019-07-01 RX ADMIN — FENTANYL CITRATE 50 MCG: 50 INJECTION INTRAMUSCULAR; INTRAVENOUS at 21:31

## 2019-07-01 RX ADMIN — PROPOFOL 20 MG: 10 INJECTION, EMULSION INTRAVENOUS at 20:36

## 2019-07-01 RX ADMIN — PROPOFOL 200 MG: 10 INJECTION, EMULSION INTRAVENOUS at 15:34

## 2019-07-01 RX ADMIN — MORPHINE SULFATE 2 MG: 2 INJECTION, SOLUTION INTRAMUSCULAR; INTRAVENOUS at 23:08

## 2019-07-01 RX ADMIN — ONDANSETRON HYDROCHLORIDE 4 MG: 2 INJECTION, SOLUTION INTRAMUSCULAR; INTRAVENOUS at 15:48

## 2019-07-01 RX ADMIN — CEFAZOLIN 1000 MG: 1 INJECTION, POWDER, FOR SOLUTION INTRAMUSCULAR; INTRAVENOUS at 19:20

## 2019-07-01 ASSESSMENT — PULMONARY FUNCTION TESTS
PIF_VALUE: 26
PIF_VALUE: 26
PIF_VALUE: 28
PIF_VALUE: 27
PIF_VALUE: 28
PIF_VALUE: 28
PIF_VALUE: 30
PIF_VALUE: 26
PIF_VALUE: 27
PIF_VALUE: 26
PIF_VALUE: 24
PIF_VALUE: 26
PIF_VALUE: 26
PIF_VALUE: 28
PIF_VALUE: 31
PIF_VALUE: 28
PIF_VALUE: 28
PIF_VALUE: 26
PIF_VALUE: 28
PIF_VALUE: 26
PIF_VALUE: 29
PIF_VALUE: 25
PIF_VALUE: 27
PIF_VALUE: 27
PIF_VALUE: 28
PIF_VALUE: 20
PIF_VALUE: 23
PIF_VALUE: 4
PIF_VALUE: 28
PIF_VALUE: 29
PIF_VALUE: 26
PIF_VALUE: 25
PIF_VALUE: 15
PIF_VALUE: 28
PIF_VALUE: 26
PIF_VALUE: 27
PIF_VALUE: 26
PIF_VALUE: 29
PIF_VALUE: 28
PIF_VALUE: 4
PIF_VALUE: 28
PIF_VALUE: 23
PIF_VALUE: 28
PIF_VALUE: 29
PIF_VALUE: 24
PIF_VALUE: 26
PIF_VALUE: 4
PIF_VALUE: 4
PIF_VALUE: 26
PIF_VALUE: 31
PIF_VALUE: 27
PIF_VALUE: 26
PIF_VALUE: 27
PIF_VALUE: 27
PIF_VALUE: 26
PIF_VALUE: 27
PIF_VALUE: 26
PIF_VALUE: 26
PIF_VALUE: 27
PIF_VALUE: 28
PIF_VALUE: 27
PIF_VALUE: 26
PIF_VALUE: 0
PIF_VALUE: 27
PIF_VALUE: 28
PIF_VALUE: 26
PIF_VALUE: 26
PIF_VALUE: 24
PIF_VALUE: 28
PIF_VALUE: 27
PIF_VALUE: 24
PIF_VALUE: 26
PIF_VALUE: 24
PIF_VALUE: 28
PIF_VALUE: 24
PIF_VALUE: 27
PIF_VALUE: 26
PIF_VALUE: 26
PIF_VALUE: 31
PIF_VALUE: 28
PIF_VALUE: 3
PIF_VALUE: 26
PIF_VALUE: 27
PIF_VALUE: 29
PIF_VALUE: 28
PIF_VALUE: 28
PIF_VALUE: 23
PIF_VALUE: 26
PIF_VALUE: 27
PIF_VALUE: 23
PIF_VALUE: 26
PIF_VALUE: 29
PIF_VALUE: 23
PIF_VALUE: 26
PIF_VALUE: 27
PIF_VALUE: 26
PIF_VALUE: 27
PIF_VALUE: 27
PIF_VALUE: 25
PIF_VALUE: 0
PIF_VALUE: 26
PIF_VALUE: 24
PIF_VALUE: 30
PIF_VALUE: 16
PIF_VALUE: 28
PIF_VALUE: 23
PIF_VALUE: 26
PIF_VALUE: 25
PIF_VALUE: 23
PIF_VALUE: 26
PIF_VALUE: 27
PIF_VALUE: 20
PIF_VALUE: 27
PIF_VALUE: 25
PIF_VALUE: 4
PIF_VALUE: 24
PIF_VALUE: 28
PIF_VALUE: 27
PIF_VALUE: 20
PIF_VALUE: 25
PIF_VALUE: 27
PIF_VALUE: 23
PIF_VALUE: 26
PIF_VALUE: 23
PIF_VALUE: 25
PIF_VALUE: 23
PIF_VALUE: 0
PIF_VALUE: 25
PIF_VALUE: 28
PIF_VALUE: 20
PIF_VALUE: 27
PIF_VALUE: 29
PIF_VALUE: 23
PIF_VALUE: 28
PIF_VALUE: 27
PIF_VALUE: 30
PIF_VALUE: 3
PIF_VALUE: 26
PIF_VALUE: 28
PIF_VALUE: 28
PIF_VALUE: 27
PIF_VALUE: 4
PIF_VALUE: 24
PIF_VALUE: 28
PIF_VALUE: 30
PIF_VALUE: 4
PIF_VALUE: 26
PIF_VALUE: 29
PIF_VALUE: 27
PIF_VALUE: 28
PIF_VALUE: 25
PIF_VALUE: 28
PIF_VALUE: 25
PIF_VALUE: 24
PIF_VALUE: 26
PIF_VALUE: 25
PIF_VALUE: 27
PIF_VALUE: 30
PIF_VALUE: 26
PIF_VALUE: 25
PIF_VALUE: 27
PIF_VALUE: 28
PIF_VALUE: 25
PIF_VALUE: 0
PIF_VALUE: 27
PIF_VALUE: 26
PIF_VALUE: 27
PIF_VALUE: 28
PIF_VALUE: 27
PIF_VALUE: 23
PIF_VALUE: 27
PIF_VALUE: 29
PIF_VALUE: 31
PIF_VALUE: 24
PIF_VALUE: 3
PIF_VALUE: 23
PIF_VALUE: 27
PIF_VALUE: 29
PIF_VALUE: 27
PIF_VALUE: 29
PIF_VALUE: 27
PIF_VALUE: 26
PIF_VALUE: 32
PIF_VALUE: 27
PIF_VALUE: 25
PIF_VALUE: 26
PIF_VALUE: 4
PIF_VALUE: 4
PIF_VALUE: 28
PIF_VALUE: 27
PIF_VALUE: 23
PIF_VALUE: 26
PIF_VALUE: 28
PIF_VALUE: 26
PIF_VALUE: 26
PIF_VALUE: 29
PIF_VALUE: 3
PIF_VALUE: 28
PIF_VALUE: 29
PIF_VALUE: 20
PIF_VALUE: 24
PIF_VALUE: 27
PIF_VALUE: 26
PIF_VALUE: 4
PIF_VALUE: 27
PIF_VALUE: 24
PIF_VALUE: 29
PIF_VALUE: 1
PIF_VALUE: 27
PIF_VALUE: 29
PIF_VALUE: 27
PIF_VALUE: 24
PIF_VALUE: 23
PIF_VALUE: 4
PIF_VALUE: 26
PIF_VALUE: 28
PIF_VALUE: 25
PIF_VALUE: 16
PIF_VALUE: 28
PIF_VALUE: 26
PIF_VALUE: 27
PIF_VALUE: 4
PIF_VALUE: 27
PIF_VALUE: 26
PIF_VALUE: 16
PIF_VALUE: 29
PIF_VALUE: 27
PIF_VALUE: 26
PIF_VALUE: 27
PIF_VALUE: 26
PIF_VALUE: 27
PIF_VALUE: 25
PIF_VALUE: 28
PIF_VALUE: 26
PIF_VALUE: 26
PIF_VALUE: 28
PIF_VALUE: 27
PIF_VALUE: 25
PIF_VALUE: 27
PIF_VALUE: 29
PIF_VALUE: 23
PIF_VALUE: 30
PIF_VALUE: 27
PIF_VALUE: 29
PIF_VALUE: 27
PIF_VALUE: 27
PIF_VALUE: 28
PIF_VALUE: 27
PIF_VALUE: 24
PIF_VALUE: 27
PIF_VALUE: 28
PIF_VALUE: 27
PIF_VALUE: 25
PIF_VALUE: 31
PIF_VALUE: 27
PIF_VALUE: 3
PIF_VALUE: 27
PIF_VALUE: 27
PIF_VALUE: 26
PIF_VALUE: 27
PIF_VALUE: 27
PIF_VALUE: 28
PIF_VALUE: 27
PIF_VALUE: 2
PIF_VALUE: 26
PIF_VALUE: 29
PIF_VALUE: 23
PIF_VALUE: 23
PIF_VALUE: 24
PIF_VALUE: 27
PIF_VALUE: 28
PIF_VALUE: 27
PIF_VALUE: 20
PIF_VALUE: 24
PIF_VALUE: 25
PIF_VALUE: 24
PIF_VALUE: 27
PIF_VALUE: 1
PIF_VALUE: 26
PIF_VALUE: 27
PIF_VALUE: 4
PIF_VALUE: 26
PIF_VALUE: 25
PIF_VALUE: 27
PIF_VALUE: 25
PIF_VALUE: 28
PIF_VALUE: 24
PIF_VALUE: 26
PIF_VALUE: 28
PIF_VALUE: 29
PIF_VALUE: 24
PIF_VALUE: 27
PIF_VALUE: 23
PIF_VALUE: 26
PIF_VALUE: 26
PIF_VALUE: 28
PIF_VALUE: 23
PIF_VALUE: 28
PIF_VALUE: 26
PIF_VALUE: 24
PIF_VALUE: 4
PIF_VALUE: 27
PIF_VALUE: 28
PIF_VALUE: 27
PIF_VALUE: 28
PIF_VALUE: 28
PIF_VALUE: 30
PIF_VALUE: 26
PIF_VALUE: 27
PIF_VALUE: 26
PIF_VALUE: 29
PIF_VALUE: 39
PIF_VALUE: 28
PIF_VALUE: 28
PIF_VALUE: 27
PIF_VALUE: 26
PIF_VALUE: 29
PIF_VALUE: 27
PIF_VALUE: 26
PIF_VALUE: 29
PIF_VALUE: 25
PIF_VALUE: 29
PIF_VALUE: 24
PIF_VALUE: 26
PIF_VALUE: 23
PIF_VALUE: 26
PIF_VALUE: 26

## 2019-07-01 ASSESSMENT — PAIN DESCRIPTION - FREQUENCY
FREQUENCY: INTERMITTENT
FREQUENCY: CONTINUOUS
FREQUENCY: CONTINUOUS
FREQUENCY: INTERMITTENT

## 2019-07-01 ASSESSMENT — PAIN DESCRIPTION - PAIN TYPE
TYPE: SURGICAL PAIN

## 2019-07-01 ASSESSMENT — PAIN DESCRIPTION - ONSET
ONSET: GRADUAL
ONSET: ON-GOING
ONSET: ON-GOING
ONSET: GRADUAL

## 2019-07-01 ASSESSMENT — PAIN DESCRIPTION - DESCRIPTORS
DESCRIPTORS: DISCOMFORT
DESCRIPTORS: ACHING;DISCOMFORT;PRESSURE
DESCRIPTORS: DISCOMFORT
DESCRIPTORS: ACHING;DISCOMFORT;PRESSURE
DESCRIPTORS: DISCOMFORT
DESCRIPTORS: DISCOMFORT

## 2019-07-01 ASSESSMENT — PAIN SCALES - GENERAL
PAINLEVEL_OUTOF10: 5
PAINLEVEL_OUTOF10: 4
PAINLEVEL_OUTOF10: 0
PAINLEVEL_OUTOF10: 5
PAINLEVEL_OUTOF10: 0
PAINLEVEL_OUTOF10: 6
PAINLEVEL_OUTOF10: 4

## 2019-07-01 ASSESSMENT — PAIN DESCRIPTION - PROGRESSION
CLINICAL_PROGRESSION: GRADUALLY WORSENING
CLINICAL_PROGRESSION: GRADUALLY IMPROVING
CLINICAL_PROGRESSION: GRADUALLY WORSENING
CLINICAL_PROGRESSION: RAPIDLY IMPROVING

## 2019-07-01 ASSESSMENT — PAIN DESCRIPTION - ORIENTATION
ORIENTATION: LOWER

## 2019-07-01 ASSESSMENT — PAIN DESCRIPTION - LOCATION
LOCATION: NECK

## 2019-07-01 ASSESSMENT — PAIN - FUNCTIONAL ASSESSMENT
PAIN_FUNCTIONAL_ASSESSMENT: ACTIVITIES ARE NOT PREVENTED
PAIN_FUNCTIONAL_ASSESSMENT: 0-10
PAIN_FUNCTIONAL_ASSESSMENT: PREVENTS OR INTERFERES SOME ACTIVE ACTIVITIES AND ADLS
PAIN_FUNCTIONAL_ASSESSMENT: PREVENTS OR INTERFERES SOME ACTIVE ACTIVITIES AND ADLS

## 2019-07-01 ASSESSMENT — LIFESTYLE VARIABLES: SMOKING_STATUS: 0

## 2019-07-02 VITALS
TEMPERATURE: 98.4 F | BODY MASS INDEX: 43.54 KG/M2 | WEIGHT: 255 LBS | SYSTOLIC BLOOD PRESSURE: 140 MMHG | RESPIRATION RATE: 16 BRPM | HEART RATE: 79 BPM | OXYGEN SATURATION: 100 % | DIASTOLIC BLOOD PRESSURE: 83 MMHG | HEIGHT: 64 IN

## 2019-07-02 PROBLEM — E89.0 STATUS POST THYROIDECTOMY: Status: ACTIVE | Noted: 2019-07-02

## 2019-07-02 LAB
CALCIUM IONIZED: 1.24 MMOL/L (ref 1.15–1.33)
CALCIUM SERPL-MCNC: 8.8 MG/DL (ref 8.6–10.2)
HCT VFR BLD CALC: 36.5 % (ref 34–48)
HEMOGLOBIN: 11.6 G/DL (ref 11.5–15.5)
PARATHYROID HORMONE INTACT: 41 PG/ML (ref 15–65)
PARATHYROID HORMONE INTACT: 55 PG/ML (ref 15–65)
PARATHYROID HORMONE INTACT: 65 PG/ML (ref 15–65)

## 2019-07-02 PROCEDURE — 85018 HEMOGLOBIN: CPT

## 2019-07-02 PROCEDURE — 36415 COLL VENOUS BLD VENIPUNCTURE: CPT

## 2019-07-02 PROCEDURE — 6370000000 HC RX 637 (ALT 250 FOR IP): Performed by: OTOLARYNGOLOGY

## 2019-07-02 PROCEDURE — 83970 ASSAY OF PARATHORMONE: CPT

## 2019-07-02 PROCEDURE — 82310 ASSAY OF CALCIUM: CPT

## 2019-07-02 PROCEDURE — 85014 HEMATOCRIT: CPT

## 2019-07-02 PROCEDURE — 60240 REMOVAL OF THYROID: CPT | Performed by: OTOLARYNGOLOGY

## 2019-07-02 PROCEDURE — 96376 TX/PRO/DX INJ SAME DRUG ADON: CPT

## 2019-07-02 PROCEDURE — 6360000002 HC RX W HCPCS: Performed by: OTOLARYNGOLOGY

## 2019-07-02 PROCEDURE — 82330 ASSAY OF CALCIUM: CPT

## 2019-07-02 PROCEDURE — 96374 THER/PROPH/DIAG INJ IV PUSH: CPT

## 2019-07-02 PROCEDURE — G0378 HOSPITAL OBSERVATION PER HR: HCPCS

## 2019-07-02 RX ORDER — LEVOTHYROXINE SODIUM 175 UG/1
175 TABLET ORAL DAILY
Qty: 30 TABLET | Refills: 0 | Status: SHIPPED | OUTPATIENT
Start: 2019-07-02 | End: 2019-07-12 | Stop reason: SDUPTHER

## 2019-07-02 RX ORDER — HYDROCODONE BITARTRATE AND ACETAMINOPHEN 5; 325 MG/1; MG/1
1 TABLET ORAL EVERY 6 HOURS PRN
Qty: 28 TABLET | Refills: 0 | Status: SHIPPED | OUTPATIENT
Start: 2019-07-02 | End: 2019-07-09

## 2019-07-02 RX ADMIN — MORPHINE SULFATE 2 MG: 2 INJECTION, SOLUTION INTRAMUSCULAR; INTRAVENOUS at 09:55

## 2019-07-02 RX ADMIN — MORPHINE SULFATE 4 MG: 4 INJECTION, SOLUTION INTRAMUSCULAR; INTRAVENOUS at 02:35

## 2019-07-02 RX ADMIN — BACITRACIN: 500 OINTMENT TOPICAL at 09:49

## 2019-07-02 ASSESSMENT — PAIN DESCRIPTION - LOCATION
LOCATION: NECK
LOCATION: NECK
LOCATION: HIP

## 2019-07-02 ASSESSMENT — PAIN DESCRIPTION - PROGRESSION
CLINICAL_PROGRESSION: GRADUALLY WORSENING
CLINICAL_PROGRESSION: RAPIDLY IMPROVING
CLINICAL_PROGRESSION: GRADUALLY WORSENING

## 2019-07-02 ASSESSMENT — PAIN DESCRIPTION - PAIN TYPE
TYPE: SURGICAL PAIN

## 2019-07-02 ASSESSMENT — PAIN SCALES - GENERAL
PAINLEVEL_OUTOF10: 3
PAINLEVEL_OUTOF10: 0
PAINLEVEL_OUTOF10: 5
PAINLEVEL_OUTOF10: 7

## 2019-07-02 ASSESSMENT — PAIN DESCRIPTION - ONSET
ONSET: ON-GOING
ONSET: ON-GOING

## 2019-07-02 ASSESSMENT — PAIN - FUNCTIONAL ASSESSMENT
PAIN_FUNCTIONAL_ASSESSMENT: ACTIVITIES ARE NOT PREVENTED
PAIN_FUNCTIONAL_ASSESSMENT: PREVENTS OR INTERFERES SOME ACTIVE ACTIVITIES AND ADLS
PAIN_FUNCTIONAL_ASSESSMENT: ACTIVITIES ARE NOT PREVENTED

## 2019-07-02 ASSESSMENT — PAIN DESCRIPTION - ORIENTATION
ORIENTATION: RIGHT
ORIENTATION: MID
ORIENTATION: MID

## 2019-07-02 ASSESSMENT — PAIN DESCRIPTION - FREQUENCY
FREQUENCY: INTERMITTENT
FREQUENCY: CONTINUOUS

## 2019-07-02 ASSESSMENT — PAIN DESCRIPTION - DESCRIPTORS
DESCRIPTORS: ACHING;CONSTANT;DISCOMFORT
DESCRIPTORS: DISCOMFORT;SORE;TENDER

## 2019-07-02 NOTE — PROGRESS NOTES
Otolaryngology Daily Progress Note -- 7/2/19  S: Patient doing well today. Reports her pain is controlled. Tolerating p.o.    O: Incision clean dry and intact. Drain with scant discharge. A/P: 43 y.o. female with postop day 1 status post total thyroidectomy.   · Saw and examined patient  · Drain pulled  · We will start the patient on Synthroid 175 mcg  · Check PTH, calcium's have been stable  · Discharge later today      Electronically signed by Wm Miranda DO on 7/2/19 at 7:26 AM

## 2019-07-02 NOTE — PROGRESS NOTES
2119 admitted to pacu post op neck incision well approx with surgical glue intact / bacitracin on and aleah drain intact 2125 labs  drawn and sent  Calcium/ ionized calcium and PTH

## 2019-07-03 NOTE — OP NOTE
be rolled medially while we dissected  posteriorly. The recurrent laryngeal nerve was noted as well as the  superior laryngeal nerve. Both of these were stimulated and verified to  be intact. Dissected was carried off of the trachea with Rodriguez forceps  as well as along the nerve. The ligament of Seda Randolph was then incised and  the thyroid was removed. Wound bed was then copiously irrigated with  normal saline. All hemostasis was obtained with bipolar cautery. The  wound was then inspected after being Valsalva'd to 40 cm of water. Surgicel powder was then inserted into the wound bed. A 10 flat ISAAK  drain was inserted in the wound bed. The straps were reapproximated  with a 3-0 chromic suture. The platysma and deep layers were then  reapproximated with 3-0 buried Vicryls. The skin was then run with a  5-0 Monocryl in a subcuticular fashion. Bacitracin was applied. Care  was then handed back to Anesthesia for appropriate awakening. All  counts were reported as correct x2. The patient tolerated the procedure  well and without complication. The patient is expected to be observed  overnight and discharged in the morning after calcium levels are  evaluated. Dr. Akua Guallpa was present for the entire procedure.         Kang Pandey DO    D: 07/03/2019 7:14:09       T: 07/03/2019 8:11:58     [email or web address]  Job#: [id number]     Doc#: [id number]    CC:

## 2019-07-12 ENCOUNTER — OFFICE VISIT (OUTPATIENT)
Dept: ENT CLINIC | Age: 39
End: 2019-07-12

## 2019-07-12 VITALS
HEIGHT: 64 IN | BODY MASS INDEX: 40.97 KG/M2 | DIASTOLIC BLOOD PRESSURE: 89 MMHG | WEIGHT: 240 LBS | HEART RATE: 83 BPM | SYSTOLIC BLOOD PRESSURE: 131 MMHG

## 2019-07-12 DIAGNOSIS — E89.0 S/P THYROIDECTOMY: Primary | ICD-10-CM

## 2019-07-12 DIAGNOSIS — E04.1 THYROID NODULE: ICD-10-CM

## 2019-07-12 DIAGNOSIS — E01.0 THYROMEGALY: ICD-10-CM

## 2019-07-12 PROCEDURE — 99024 POSTOP FOLLOW-UP VISIT: CPT | Performed by: OTOLARYNGOLOGY

## 2019-07-12 RX ORDER — LEVOTHYROXINE SODIUM 175 UG/1
175 TABLET ORAL DAILY
Qty: 30 TABLET | Refills: 1 | Status: SHIPPED
Start: 2019-07-12 | End: 2020-06-05

## 2019-07-12 ASSESSMENT — ENCOUNTER SYMPTOMS
CHOKING: 0
ABDOMINAL PAIN: 0
SORE THROAT: 0
VOMITING: 0
NAUSEA: 0
EYE PAIN: 0
ALLERGIC/IMMUNOLOGIC NEGATIVE: 1
SHORTNESS OF BREATH: 0
COUGH: 0
CHEST TIGHTNESS: 0
VOICE CHANGE: 0
TROUBLE SWALLOWING: 0
EYE ITCHING: 0
SINUS PAIN: 0
EYE REDNESS: 0
RHINORRHEA: 0
DIARRHEA: 0
SINUS PRESSURE: 0

## 2019-07-12 NOTE — PROGRESS NOTES
goiter    Comment:    Intradepartmental consultation is obtained. Assessment:          Diagnosis Orders   1. S/P thyroidectomy  TSH without Reflex    T4, Free   2. Thyroid nodule     3. Thyromegaly               Plan:       Pt is doing ok I will recheck the patient in a few weeks to make sure her TSH is still running in the right level. Continue synthroid for now. Followup as scheduled                       Lianne Fields  1980    I have discussed the case, including pertinent history and exam findings with the student. I have seen and examined the patient and the key elements of the encounter have been performed by me. I agree with the assessment, plan and orders as documented by the  student    Patient is here to establish care as a established patient in the clinic. Remainder of medical problems as per  student note. Patient seen and examined. Agree with above exam, assessment and plan.       Electronically signed by Kierra Lawrence DO on 7/15/19 at 9:19 AM

## 2019-07-17 ENCOUNTER — APPOINTMENT (OUTPATIENT)
Dept: GENERAL RADIOLOGY | Age: 39
End: 2019-07-17
Payer: COMMERCIAL

## 2019-07-17 ENCOUNTER — HOSPITAL ENCOUNTER (OUTPATIENT)
Age: 39
Discharge: HOME | End: 2019-07-17
Payer: COMMERCIAL

## 2019-07-17 ENCOUNTER — HOSPITAL ENCOUNTER (EMERGENCY)
Age: 39
Discharge: HOME | End: 2019-07-17
Attending: EMERGENCY MEDICINE
Payer: COMMERCIAL

## 2019-07-17 VITALS
WEIGHT: 240 LBS | BODY MASS INDEX: 40.97 KG/M2 | TEMPERATURE: 98.1 F | SYSTOLIC BLOOD PRESSURE: 142 MMHG | OXYGEN SATURATION: 100 % | HEIGHT: 64 IN | DIASTOLIC BLOOD PRESSURE: 83 MMHG | HEART RATE: 78 BPM | RESPIRATION RATE: 14 BRPM

## 2019-07-17 DIAGNOSIS — S93.602A SPRAIN OF LEFT FOOT, INITIAL ENCOUNTER: Primary | ICD-10-CM

## 2019-07-17 LAB
T4 FREE: 2.43 NG/DL (ref 0.93–1.7)
TSH SERPL DL<=0.05 MIU/L-ACNC: 0.02 UIU/ML (ref 0.27–4.2)

## 2019-07-17 PROCEDURE — 99283 EMERGENCY DEPT VISIT LOW MDM: CPT

## 2019-07-17 PROCEDURE — 84443 ASSAY THYROID STIM HORMONE: CPT

## 2019-07-17 PROCEDURE — 84439 ASSAY OF FREE THYROXINE: CPT

## 2019-07-17 PROCEDURE — 73630 X-RAY EXAM OF FOOT: CPT

## 2019-07-17 PROCEDURE — 36415 COLL VENOUS BLD VENIPUNCTURE: CPT

## 2019-07-17 PROCEDURE — 73610 X-RAY EXAM OF ANKLE: CPT

## 2019-07-17 RX ORDER — NAPROXEN 500 MG/1
500 TABLET ORAL 2 TIMES DAILY WITH MEALS
Qty: 28 TABLET | Refills: 0 | Status: SHIPPED | OUTPATIENT
Start: 2019-07-17 | End: 2020-06-05

## 2019-07-17 RX ORDER — NAPROXEN 500 MG/1
500 TABLET ORAL 2 TIMES DAILY WITH MEALS
Qty: 60 TABLET | Refills: 0 | Status: SHIPPED | OUTPATIENT
Start: 2019-07-17 | End: 2019-07-17 | Stop reason: SDUPTHER

## 2019-07-17 ASSESSMENT — ENCOUNTER SYMPTOMS
SHORTNESS OF BREATH: 0
COUGH: 0
NAUSEA: 0
VOMITING: 0

## 2019-07-17 ASSESSMENT — PAIN SCALES - GENERAL: PAINLEVEL_OUTOF10: 4

## 2019-07-17 ASSESSMENT — PAIN DESCRIPTION - FREQUENCY: FREQUENCY: CONTINUOUS

## 2019-07-17 ASSESSMENT — PAIN DESCRIPTION - PAIN TYPE: TYPE: CHRONIC PAIN

## 2019-07-17 ASSESSMENT — PAIN DESCRIPTION - ORIENTATION: ORIENTATION: LEFT

## 2019-07-17 ASSESSMENT — PAIN DESCRIPTION - LOCATION: LOCATION: FOOT

## 2019-09-03 ENCOUNTER — TELEPHONE (OUTPATIENT)
Dept: ENT CLINIC | Age: 39
End: 2019-09-03

## 2019-09-24 ENCOUNTER — OFFICE VISIT (OUTPATIENT)
Dept: ENT CLINIC | Age: 39
End: 2019-09-24

## 2019-09-24 VITALS
SYSTOLIC BLOOD PRESSURE: 116 MMHG | WEIGHT: 248 LBS | TEMPERATURE: 98.2 F | HEIGHT: 64 IN | BODY MASS INDEX: 42.34 KG/M2 | OXYGEN SATURATION: 98 % | HEART RATE: 88 BPM | DIASTOLIC BLOOD PRESSURE: 80 MMHG

## 2019-09-24 DIAGNOSIS — E01.0 THYROMEGALY: ICD-10-CM

## 2019-09-24 DIAGNOSIS — E04.1 THYROID NODULE: Primary | ICD-10-CM

## 2019-09-24 DIAGNOSIS — E89.0 S/P THYROIDECTOMY: ICD-10-CM

## 2019-09-24 PROCEDURE — G8417 CALC BMI ABV UP PARAM F/U: HCPCS | Performed by: OTOLARYNGOLOGY

## 2019-09-24 PROCEDURE — G8427 DOCREV CUR MEDS BY ELIG CLIN: HCPCS | Performed by: OTOLARYNGOLOGY

## 2019-09-24 PROCEDURE — 99024 POSTOP FOLLOW-UP VISIT: CPT | Performed by: OTOLARYNGOLOGY

## 2019-09-24 PROCEDURE — 1036F TOBACCO NON-USER: CPT | Performed by: OTOLARYNGOLOGY

## 2019-09-24 RX ORDER — LEVOTHYROXINE SODIUM 0.12 MG/1
100 TABLET ORAL
COMMUNITY
Start: 2019-09-23 | End: 2020-06-05

## 2019-09-24 ASSESSMENT — ENCOUNTER SYMPTOMS
SORE THROAT: 0
CHOKING: 0
EYE PAIN: 0
CHEST TIGHTNESS: 0
SHORTNESS OF BREATH: 0
RHINORRHEA: 0
COUGH: 0
SINUS PAIN: 0
ALLERGIC/IMMUNOLOGIC NEGATIVE: 1
NAUSEA: 0
EYE REDNESS: 0
DIARRHEA: 0
VOMITING: 0
EYE ITCHING: 0
SINUS PRESSURE: 0
TROUBLE SWALLOWING: 0
ABDOMINAL PAIN: 0
VOICE CHANGE: 0

## 2020-04-01 ENCOUNTER — OFFICE VISIT (OUTPATIENT)
Dept: SURGERY | Age: 40
End: 2020-04-01
Payer: COMMERCIAL

## 2020-04-01 ENCOUNTER — TELEPHONE (OUTPATIENT)
Dept: SURGERY | Age: 40
End: 2020-04-01

## 2020-04-01 ENCOUNTER — HOSPITAL ENCOUNTER (OUTPATIENT)
Age: 40
Discharge: HOME | End: 2020-04-03
Payer: COMMERCIAL

## 2020-04-01 VITALS
HEIGHT: 63 IN | OXYGEN SATURATION: 98 % | HEART RATE: 86 BPM | TEMPERATURE: 98.6 F | DIASTOLIC BLOOD PRESSURE: 94 MMHG | WEIGHT: 253 LBS | BODY MASS INDEX: 44.83 KG/M2 | SYSTOLIC BLOOD PRESSURE: 150 MMHG

## 2020-04-01 LAB
ALBUMIN SERPL-MCNC: 3.9 G/DL (ref 3.5–5.2)
ALP BLD-CCNC: 60 U/L (ref 35–104)
ALT SERPL-CCNC: 9 U/L (ref 0–32)
ANION GAP SERPL CALCULATED.3IONS-SCNC: 11 MMOL/L (ref 7–16)
AST SERPL-CCNC: 18 U/L (ref 0–31)
BASOPHILS ABSOLUTE: 0.03 E9/L (ref 0–0.2)
BASOPHILS RELATIVE PERCENT: 0.6 % (ref 0–2)
BILIRUB SERPL-MCNC: 0.5 MG/DL (ref 0–1.2)
BUN BLDV-MCNC: 9 MG/DL (ref 6–20)
CALCIUM SERPL-MCNC: 9.3 MG/DL (ref 8.6–10.2)
CHLORIDE BLD-SCNC: 106 MMOL/L (ref 98–107)
CO2: 25 MMOL/L (ref 22–29)
CREAT SERPL-MCNC: 0.9 MG/DL (ref 0.5–1)
EOSINOPHILS ABSOLUTE: 0.1 E9/L (ref 0.05–0.5)
EOSINOPHILS RELATIVE PERCENT: 1.9 % (ref 0–6)
GFR AFRICAN AMERICAN: >60
GFR NON-AFRICAN AMERICAN: >60 ML/MIN/1.73
GLUCOSE BLD-MCNC: 83 MG/DL (ref 74–99)
HCT VFR BLD CALC: 36.9 % (ref 34–48)
HEMOGLOBIN: 11.2 G/DL (ref 11.5–15.5)
IMMATURE GRANULOCYTES #: 0.02 E9/L
IMMATURE GRANULOCYTES %: 0.4 % (ref 0–5)
LYMPHOCYTES ABSOLUTE: 1.87 E9/L (ref 1.5–4)
LYMPHOCYTES RELATIVE PERCENT: 36.3 % (ref 20–42)
MCH RBC QN AUTO: 24 PG (ref 26–35)
MCHC RBC AUTO-ENTMCNC: 30.4 % (ref 32–34.5)
MCV RBC AUTO: 79 FL (ref 80–99.9)
MONOCYTES ABSOLUTE: 0.49 E9/L (ref 0.1–0.95)
MONOCYTES RELATIVE PERCENT: 9.5 % (ref 2–12)
NEUTROPHILS ABSOLUTE: 2.64 E9/L (ref 1.8–7.3)
NEUTROPHILS RELATIVE PERCENT: 51.3 % (ref 43–80)
PDW BLD-RTO: 16 FL (ref 11.5–15)
PLATELET # BLD: 347 E9/L (ref 130–450)
PMV BLD AUTO: 10.3 FL (ref 7–12)
POTASSIUM SERPL-SCNC: 4 MMOL/L (ref 3.5–5)
RBC # BLD: 4.67 E12/L (ref 3.5–5.5)
SODIUM BLD-SCNC: 142 MMOL/L (ref 132–146)
TOTAL PROTEIN: 7.4 G/DL (ref 6.4–8.3)
WBC # BLD: 5.2 E9/L (ref 4.5–11.5)

## 2020-04-01 PROCEDURE — 36415 COLL VENOUS BLD VENIPUNCTURE: CPT

## 2020-04-01 PROCEDURE — 99204 OFFICE O/P NEW MOD 45 MIN: CPT | Performed by: SURGERY

## 2020-04-01 PROCEDURE — 85025 COMPLETE CBC W/AUTO DIFF WBC: CPT

## 2020-04-01 PROCEDURE — G8417 CALC BMI ABV UP PARAM F/U: HCPCS | Performed by: SURGERY

## 2020-04-01 PROCEDURE — G8427 DOCREV CUR MEDS BY ELIG CLIN: HCPCS | Performed by: SURGERY

## 2020-04-01 PROCEDURE — 1036F TOBACCO NON-USER: CPT | Performed by: SURGERY

## 2020-04-01 PROCEDURE — 80053 COMPREHEN METABOLIC PANEL: CPT

## 2020-04-01 RX ORDER — SODIUM CHLORIDE 0.9 % (FLUSH) 0.9 %
10 SYRINGE (ML) INJECTION PRN
Status: CANCELLED | OUTPATIENT
Start: 2020-04-01

## 2020-04-01 RX ORDER — SODIUM CHLORIDE 0.9 % (FLUSH) 0.9 %
10 SYRINGE (ML) INJECTION EVERY 12 HOURS SCHEDULED
Status: CANCELLED | OUTPATIENT
Start: 2020-04-01

## 2020-04-01 RX ORDER — LEVOTHYROXINE SODIUM 0.1 MG/1
TABLET ORAL
COMMUNITY
Start: 2020-03-29 | End: 2020-10-17 | Stop reason: ALTCHOICE

## 2020-04-01 RX ORDER — DOCUSATE SODIUM 100 MG/1
100 CAPSULE, LIQUID FILLED ORAL DAILY PRN
Qty: 30 CAPSULE | Refills: 0 | Status: SHIPPED | OUTPATIENT
Start: 2020-04-01 | End: 2020-05-01

## 2020-04-01 RX ORDER — SODIUM CHLORIDE 9 MG/ML
INJECTION, SOLUTION INTRAVENOUS CONTINUOUS
Status: CANCELLED | OUTPATIENT
Start: 2020-04-01

## 2020-04-01 RX ORDER — POLYETHYLENE GLYCOL 3350, SODIUM SULFATE ANHYDROUS, SODIUM BICARBONATE, SODIUM CHLORIDE, POTASSIUM CHLORIDE 236; 22.74; 6.74; 5.86; 2.97 G/4L; G/4L; G/4L; G/4L; G/4L
4 POWDER, FOR SOLUTION ORAL ONCE
Qty: 4000 ML | Refills: 0 | Status: SHIPPED | OUTPATIENT
Start: 2020-04-01 | End: 2020-04-01

## 2020-04-01 NOTE — PROGRESS NOTES
file   Occupational History    Not on file   Social Needs    Financial resource strain: Not on file    Food insecurity     Worry: Not on file     Inability: Not on file    Transportation needs     Medical: Not on file     Non-medical: Not on file   Tobacco Use    Smoking status: Never Smoker    Smokeless tobacco: Never Used   Substance and Sexual Activity    Alcohol use: Yes     Comment: rare    Drug use: No    Sexual activity: Not on file   Lifestyle    Physical activity     Days per week: Not on file     Minutes per session: Not on file    Stress: Not on file   Relationships    Social connections     Talks on phone: Not on file     Gets together: Not on file     Attends Congregational service: Not on file     Active member of club or organization: Not on file     Attends meetings of clubs or organizations: Not on file     Relationship status: Not on file    Intimate partner violence     Fear of current or ex partner: Not on file     Emotionally abused: Not on file     Physically abused: Not on file     Forced sexual activity: Not on file   Other Topics Concern    Not on file   Social History Narrative    Not on file           Review of Systems    A complete 10 system review was performed and are otherwise negative unless mentioned in the above HPI. Specific negatives are listed below but may not include all those reviewed.     General ROS: negative obtundation, AMS  ENT ROS: negative rhinorrhea, epistaxis  Allergy and Immunology ROS: negative itchy/watery eyes or nasal congestion  Hematological and Lymphatic ROS: negative spontaneous bleeding or bruising  Endocrine ROS: negative  lethargy, mood swings, palpitations or polydipsia/polyuria  Respiratory ROS: negative sputum changes, stridor, tachypnea or wheezing  Cardiovascular ROS: negative for - loss of consciousness, murmur or orthopnea  Gastrointestinal ROS: negative for -  hematemesis  Genito-Urinary ROS: negative for -  genital discharge or

## 2020-04-01 NOTE — TELEPHONE ENCOUNTER
Per Dr. Deloris Pederson, patient is scheduled for Colonoscopy with possible HET therapy at [facility] on 6/8/20. Surgery scheduling form faxed with confirmation, surgeon's calendar updated. Dr. Deloris Pederson to enter orders. Golytely bowel prep instructions provided and discussed. Follow up appointment scheduled. Will check if pre-cert is required.    Electronically signed by Jeff Pace RN on 4/1/2020 at 12:52 PM

## 2020-04-01 NOTE — PATIENT INSTRUCTIONS
instead of white bread). · Include a variety of grains in your diet, such as wheat, rye, barley, oats, quinoa, and bulgur. · Eat more vegetarian-based meals. Here are some ideas: black bean burgers, eggplant lasagna, and veggie tofu stir-elizabeth. · Choose high-fiber snacks, such as fruits, popcorn, whole-grain crackers, and nuts. · Make whole-grain cereal or whole-grain toast part of your daily breakfast regime. · When eating out, whether ordering a sandwich or dinner, ask for extra vegetables. · When baking, replace part of the white flour with whole-wheat flour. Whole-wheat flour is particularly easy to incorporate into a recipe. High-Fiber Diet Eating Guide   Food Category   Foods Recommended   Notes   Grains   Whole-grain breads, muffins, bagels, or shannon bread Rye bread Whole-wheat crackers or crisp breads Whole-grain or bran cereals Oatmeal, oat bran, or grits Wheat germ Whole-wheat pasta and brown rice   Read the ingredients list on food labels. Look for products that list \"whole\" as the first ingredient (eg, whole-wheat, whole oats). Choose cereals with at least 2 grams of fiber per serving. Vegetables   All vegetables, especially asparagus, bean sprouts, broccoli, [place] sprouts, cabbage, carrots, cauliflower, celery, corn, greens, green beans, green pepper, onions, peas, potatoes (with skin), snow peas, spinach, squash, sweet potatoes, tomatoes, zucchini   For maximum fiber intake, eat the peels of fruits and vegetablesjust be sure to wash them well first.   Fruits   All fruits, especially apples, berries, grapefruits, mangoes, nectarines, oranges, peaches, pears, dried fruits (figs, dates, prunes, raisins)   Choose raw fruits and vegetables over juice, cooked, or cannedraw fruit has more fiber. Dried fruit is also a good source of fiber. Milk   With the exception of yogurt containing inulin (a type of fiber), dairy foods provide little fiber.    Add more fiber by topping your yogurt

## 2020-04-02 NOTE — TELEPHONE ENCOUNTER
MA called and spoke with Luzma regarding CPT 67375 and ICD 10 K62.5. Rep advised there was No prior auth required authorization required. Ref # [id number]    Forms scanned into chart.     Electronically signed by Roro Lai on 4/2/2020 at 9:16 AM

## 2020-06-05 ENCOUNTER — HOSPITAL ENCOUNTER (OUTPATIENT)
Age: 40
Discharge: HOME | End: 2020-06-07
Payer: COMMERCIAL

## 2020-06-05 PROCEDURE — U0003 INFECTIOUS AGENT DETECTION BY NUCLEIC ACID (DNA OR RNA); SEVERE ACUTE RESPIRATORY SYNDROME CORONAVIRUS 2 (SARS-COV-2) (CORONAVIRUS DISEASE [COVID-19]), AMPLIFIED PROBE TECHNIQUE, MAKING USE OF HIGH THROUGHPUT TECHNOLOGIES AS DESCRIBED BY CMS-2020-01-R: HCPCS

## 2020-06-05 NOTE — PROGRESS NOTES
[address] [facility]                                                                                                                    PRE OP INSTRUCTIONS FOR  Flex Maier        Date: 6/5/2020    Date of surgery: 6/8/2020   Arrival Time: Hospital will call you between 5pm and 7pm with your final arrival time for surgery    1. Do not eat or drink anything after 12 prior to surgery. This includes no water, chewing gum, mints or ice chips. 2. Take the following medications with a small sip of water on the morning of Surgery: synthroid     3. Diabetics may take evening dose of insulin but none after midnight. If you feel symptomatic or low blood sugar morning of surgery drink 1-2 ounces of apple juice only. 4. Aspirin, Ibuprofen, Advil, Naproxen, Vitamin E and other Anti-inflammatory products should be stopped  before surgery  as directed by your physician. Take Tylenol only unless instructed otherwise by your surgeon. 5. Check with your Doctor regarding stopping Plavix, Coumadin, Lovenox, Eliquis, Effient, or other blood thinners. 6. Do not smoke,use illicit drugs and do not drink any alcoholic beverages 24 hours prior to surgery. 7. You may brush your teeth the morning of surgery. DO NOT SWALLOW WATER    8. You MUST make arrangements for a responsible adult to take you home after your surgery. You will not be allowed to leave alone or drive yourself home. It is strongly suggested someone stay with you the first 24 hrs. Your surgery will be cancelled if you do not have a ride home. 9. PEDIATRIC PATIENTS ONLY:  A parent/legal guardian must accompany a child scheduled for surgery and plan to stay at the hospital until the child is discharged. Please do not bring other children with you.     10. Please wear simple, loose fitting clothing to the hospital.  Rohini Leni not bring valuables (money, credit cards, checkbooks, etc.) Do not wear any makeup (including no eye makeup) or nail polish

## 2020-06-07 LAB
SARS-COV-2: NOT DETECTED
SOURCE: NORMAL

## 2020-06-08 ENCOUNTER — HOSPITAL ENCOUNTER (OUTPATIENT)
Age: 40
Setting detail: OUTPATIENT SURGERY
Discharge: HOME | End: 2020-06-08
Attending: SURGERY | Admitting: SURGERY
Payer: COMMERCIAL

## 2020-06-08 ENCOUNTER — ANESTHESIA EVENT (OUTPATIENT)
Dept: ENDOSCOPY | Age: 40
End: 2020-06-08
Payer: COMMERCIAL

## 2020-06-08 ENCOUNTER — ANESTHESIA (OUTPATIENT)
Dept: ENDOSCOPY | Age: 40
End: 2020-06-08
Payer: COMMERCIAL

## 2020-06-08 VITALS
RESPIRATION RATE: 16 BRPM | OXYGEN SATURATION: 99 % | HEIGHT: 64 IN | HEART RATE: 68 BPM | SYSTOLIC BLOOD PRESSURE: 130 MMHG | WEIGHT: 250 LBS | TEMPERATURE: 97.8 F | DIASTOLIC BLOOD PRESSURE: 90 MMHG | BODY MASS INDEX: 42.68 KG/M2

## 2020-06-08 VITALS — DIASTOLIC BLOOD PRESSURE: 78 MMHG | SYSTOLIC BLOOD PRESSURE: 165 MMHG | OXYGEN SATURATION: 100 %

## 2020-06-08 LAB — HCG(URINE) PREGNANCY TEST: NEGATIVE

## 2020-06-08 PROCEDURE — 45378 DIAGNOSTIC COLONOSCOPY: CPT | Performed by: SURGERY

## 2020-06-08 PROCEDURE — 2500000003 HC RX 250 WO HCPCS: Performed by: NURSE ANESTHETIST, CERTIFIED REGISTERED

## 2020-06-08 PROCEDURE — 3609027000 HC COLONOSCOPY: Performed by: SURGERY

## 2020-06-08 PROCEDURE — 7100000011 HC PHASE II RECOVERY - ADDTL 15 MIN: Performed by: SURGERY

## 2020-06-08 PROCEDURE — 3700000000 HC ANESTHESIA ATTENDED CARE: Performed by: SURGERY

## 2020-06-08 PROCEDURE — 6360000002 HC RX W HCPCS: Performed by: NURSE ANESTHETIST, CERTIFIED REGISTERED

## 2020-06-08 PROCEDURE — 7100000010 HC PHASE II RECOVERY - FIRST 15 MIN: Performed by: SURGERY

## 2020-06-08 PROCEDURE — 2709999900 HC NON-CHARGEABLE SUPPLY: Performed by: SURGERY

## 2020-06-08 PROCEDURE — 46946 INT HRHC LIG 2+HROID W/O IMG: CPT | Performed by: SURGERY

## 2020-06-08 PROCEDURE — 3609021400: Performed by: SURGERY

## 2020-06-08 PROCEDURE — 81025 URINE PREGNANCY TEST: CPT

## 2020-06-08 PROCEDURE — 3700000001 HC ADD 15 MINUTES (ANESTHESIA): Performed by: SURGERY

## 2020-06-08 PROCEDURE — 2580000003 HC RX 258: Performed by: SURGERY

## 2020-06-08 RX ORDER — PROPOFOL 10 MG/ML
INJECTION, EMULSION INTRAVENOUS PRN
Status: DISCONTINUED | OUTPATIENT
Start: 2020-06-08 | End: 2020-06-08 | Stop reason: SDUPTHER

## 2020-06-08 RX ORDER — SODIUM CHLORIDE 9 MG/ML
INJECTION, SOLUTION INTRAVENOUS CONTINUOUS
Status: DISCONTINUED | OUTPATIENT
Start: 2020-06-08 | End: 2020-06-08 | Stop reason: HOSPADM

## 2020-06-08 RX ORDER — SODIUM CHLORIDE 0.9 % (FLUSH) 0.9 %
10 SYRINGE (ML) INJECTION EVERY 12 HOURS SCHEDULED
Status: DISCONTINUED | OUTPATIENT
Start: 2020-06-08 | End: 2020-06-08 | Stop reason: HOSPADM

## 2020-06-08 RX ORDER — SODIUM CHLORIDE 0.9 % (FLUSH) 0.9 %
10 SYRINGE (ML) INJECTION PRN
Status: DISCONTINUED | OUTPATIENT
Start: 2020-06-08 | End: 2020-06-08 | Stop reason: HOSPADM

## 2020-06-08 RX ORDER — LIDOCAINE HYDROCHLORIDE 20 MG/ML
INJECTION, SOLUTION INFILTRATION; PERINEURAL PRN
Status: DISCONTINUED | OUTPATIENT
Start: 2020-06-08 | End: 2020-06-08 | Stop reason: SDUPTHER

## 2020-06-08 RX ORDER — DOCUSATE SODIUM 100 MG/1
100 CAPSULE, LIQUID FILLED ORAL DAILY PRN
Qty: 30 CAPSULE | Refills: 0 | Status: SHIPPED | OUTPATIENT
Start: 2020-06-08 | End: 2020-10-16

## 2020-06-08 RX ORDER — IBUPROFEN 800 MG/1
800 TABLET ORAL 2 TIMES DAILY PRN
Qty: 180 TABLET | Refills: 1 | Status: SHIPPED | OUTPATIENT
Start: 2020-06-08

## 2020-06-08 RX ORDER — OXYCODONE HYDROCHLORIDE AND ACETAMINOPHEN 5; 325 MG/1; MG/1
1 TABLET ORAL EVERY 6 HOURS PRN
Qty: 18 TABLET | Refills: 0 | Status: SHIPPED | OUTPATIENT
Start: 2020-06-08 | End: 2020-06-08 | Stop reason: HOSPADM

## 2020-06-08 RX ADMIN — PROPOFOL 10 MG: 10 INJECTION, EMULSION INTRAVENOUS at 15:19

## 2020-06-08 RX ADMIN — PROPOFOL 10 MG: 10 INJECTION, EMULSION INTRAVENOUS at 15:06

## 2020-06-08 RX ADMIN — PROPOFOL 20 MG: 10 INJECTION, EMULSION INTRAVENOUS at 15:12

## 2020-06-08 RX ADMIN — PROPOFOL 20 MG: 10 INJECTION, EMULSION INTRAVENOUS at 15:04

## 2020-06-08 RX ADMIN — PROPOFOL 20 MG: 10 INJECTION, EMULSION INTRAVENOUS at 15:05

## 2020-06-08 RX ADMIN — PROPOFOL 60 MG: 10 INJECTION, EMULSION INTRAVENOUS at 15:00

## 2020-06-08 RX ADMIN — SODIUM CHLORIDE: 9 INJECTION, SOLUTION INTRAVENOUS at 14:14

## 2020-06-08 RX ADMIN — PROPOFOL 10 MG: 10 INJECTION, EMULSION INTRAVENOUS at 15:22

## 2020-06-08 RX ADMIN — PROPOFOL 20 MG: 10 INJECTION, EMULSION INTRAVENOUS at 15:08

## 2020-06-08 RX ADMIN — PROPOFOL 10 MG: 10 INJECTION, EMULSION INTRAVENOUS at 15:21

## 2020-06-08 RX ADMIN — PROPOFOL 30 MG: 10 INJECTION, EMULSION INTRAVENOUS at 15:02

## 2020-06-08 RX ADMIN — PROPOFOL 10 MG: 10 INJECTION, EMULSION INTRAVENOUS at 15:14

## 2020-06-08 RX ADMIN — PROPOFOL 10 MG: 10 INJECTION, EMULSION INTRAVENOUS at 15:16

## 2020-06-08 RX ADMIN — PROPOFOL 20 MG: 10 INJECTION, EMULSION INTRAVENOUS at 15:10

## 2020-06-08 RX ADMIN — LIDOCAINE HYDROCHLORIDE 20 MG: 20 INJECTION, SOLUTION INFILTRATION; PERINEURAL at 15:00

## 2020-06-08 RX ADMIN — PROPOFOL 10 MG: 10 INJECTION, EMULSION INTRAVENOUS at 15:03

## 2020-06-08 RX ADMIN — SODIUM CHLORIDE: 9 INJECTION, SOLUTION INTRAVENOUS at 14:52

## 2020-06-08 ASSESSMENT — PAIN - FUNCTIONAL ASSESSMENT: PAIN_FUNCTIONAL_ASSESSMENT: 0-10

## 2020-06-08 NOTE — H&P
General Surgery History and Physical  [facility]    Patient's Name/Date of Birth: Aparna Guzman / 1980    Date: June 8, 2020     Surgeon: Rebeca Fierro M.D.    PCP: IVÁN Giordano NP     Chief Complaint: Rectal bleeding, anal pain    HPI:   Aparna Guzman is a 35 y.o. female who presents for evaluation of rectal bleeding and anal pain. Timing is intermittent, radiation to anus, alleviated by abstaining from bowel movements, not eating and started several months ago and severity is 8/10. Patient has had colonoscopy in last 5 years off [employer] but unk who did it. Denies previous interventions for hemorrhoid treatment. Has tried fiber supplementation for greater than 6 weeks with little to no improvement. Has tried tucks pads, witch hazel and stool softeners with little to no improvement. No history of blood transfusion. Patient Active Problem List   Diagnosis    Environmental allergies    Impaired fasting glucose    Hyperlipidemia    Post-op pain    Thyroid nodule    Status post thyroidectomy       Past Medical History:   Diagnosis Date    Environmental allergies 9/21/2018    Thyroid disease        Past Surgical History:   Procedure Laterality Date    COLONOSCOPY      THYROIDECTOMY N/A 7/1/2019    TOTAL THYROIDECTOMY WITH NERVE INTEGRITY MONITOR performed by Jennifer Dinh DO at [address] WISDOM TOOTH EXTRACTION         Allergies   Allergen Reactions    Seasonal      sneeze       The patient has a family history that is negative for severe cardiovascular or respiratory issues, negative for reaction to anesthesia. Time spent reviewing past medical, surgical, social and family history, vitals, nursing assessment and images. No changes from above documented history.     Social History     Socioeconomic History    Marital status: Single     Spouse name: Not on file    Number of children: Not on file    Years of education: Not on file

## 2020-06-22 ENCOUNTER — OFFICE VISIT (OUTPATIENT)
Dept: SURGERY | Age: 40
End: 2020-06-22

## 2020-06-22 VITALS — HEART RATE: 83 BPM | DIASTOLIC BLOOD PRESSURE: 89 MMHG | SYSTOLIC BLOOD PRESSURE: 138 MMHG | TEMPERATURE: 98 F

## 2020-06-22 PROCEDURE — 99024 POSTOP FOLLOW-UP VISIT: CPT | Performed by: SURGERY

## 2020-06-22 NOTE — PROGRESS NOTES
genital discharge or hematuria  Musculoskeletal ROS: negative for - focal weakness, gangrene  Psych/Neuro ROS: negative for - visual or auditory hallucinations, suicidal ideation      Time spent reviewing past medical, surgical, social and family history, vitals, nursing assessment and images.       Assessment/Plan:  Grace Haynes is a 35 y.o. female s/p cscope with HET therapy    High fiber diet  Repeat Cscope in 10yrs    Physician Signature: Electronically signed by Dr. Oscra Pichardo  [phone] (p)  6/22/2020  10:43 AM

## 2020-06-22 NOTE — LETTER
[facility]  [address]  [address]  Phone: [phone]  Fax: [phone]    Sharon Jolley MD        6/22/2020    Patient: Juana Durham   YOB: 1980   Date of Visit: 6/22/2020       To Whom it May Concern:    Juana Durham was seen in my clinic on 6/22/2020. Juana Durham recently underwent a surgical procedure/hospitalization with us on 6/8. They were seen in our office on 6/22/2020. They may return to unrestricted activity on 6/24. Thank you for your assistance in helping us accommodate Juana Durham in their time of recovery. If you have any questions or concerns, please don't hesitate to call.     Sincerely,      Franklin Ledbetter MD, MS  Minimally Invasive General Surgery

## 2020-10-16 RX ORDER — DOCUSATE SODIUM 100 MG/1
100 CAPSULE, LIQUID FILLED ORAL DAILY PRN
Qty: 30 CAPSULE | Refills: 0 | Status: SHIPPED | OUTPATIENT
Start: 2020-10-16

## 2020-10-17 ENCOUNTER — HOSPITAL ENCOUNTER (EMERGENCY)
Age: 40
Discharge: HOME | End: 2020-10-17
Attending: EMERGENCY MEDICINE
Payer: COMMERCIAL

## 2020-10-17 VITALS
SYSTOLIC BLOOD PRESSURE: 128 MMHG | HEART RATE: 63 BPM | DIASTOLIC BLOOD PRESSURE: 85 MMHG | WEIGHT: 260 LBS | HEIGHT: 65 IN | TEMPERATURE: 97.1 F | OXYGEN SATURATION: 99 % | BODY MASS INDEX: 43.32 KG/M2 | RESPIRATION RATE: 18 BRPM

## 2020-10-17 PROCEDURE — 6360000002 HC RX W HCPCS: Performed by: STUDENT IN AN ORGANIZED HEALTH CARE EDUCATION/TRAINING PROGRAM

## 2020-10-17 PROCEDURE — 6370000000 HC RX 637 (ALT 250 FOR IP): Performed by: STUDENT IN AN ORGANIZED HEALTH CARE EDUCATION/TRAINING PROGRAM

## 2020-10-17 PROCEDURE — 99283 EMERGENCY DEPT VISIT LOW MDM: CPT

## 2020-10-17 RX ORDER — PREDNISONE 50 MG/1
50 TABLET ORAL DAILY
Qty: 5 TABLET | Refills: 0 | Status: SHIPPED | OUTPATIENT
Start: 2020-10-17 | End: 2020-10-22

## 2020-10-17 RX ORDER — DIPHENHYDRAMINE HCL 25 MG
50 TABLET ORAL ONCE
Status: COMPLETED | OUTPATIENT
Start: 2020-10-17 | End: 2020-10-17

## 2020-10-17 RX ORDER — DIPHENHYDRAMINE HCL 25 MG
25 CAPSULE ORAL EVERY 6 HOURS PRN
Qty: 20 CAPSULE | Refills: 0 | Status: SHIPPED | OUTPATIENT
Start: 2020-10-17 | End: 2020-10-27

## 2020-10-17 RX ORDER — LEVOTHYROXINE SODIUM 112 UG/1
112 TABLET ORAL DAILY
COMMUNITY

## 2020-10-17 RX ORDER — FERROUS SULFATE 325(65) MG
325 TABLET ORAL
COMMUNITY
End: 2021-11-09 | Stop reason: ALTCHOICE

## 2020-10-17 RX ADMIN — DIPHENHYDRAMINE HYDROCHLORIDE 50 MG: 25 TABLET ORAL at 09:23

## 2020-10-17 RX ADMIN — DEXAMETHASONE 10 MG: 1 TABLET ORAL at 09:24

## 2020-10-17 ASSESSMENT — ENCOUNTER SYMPTOMS
WHEEZING: 0
COUGH: 0
NAUSEA: 0
EYE PAIN: 0
DIARRHEA: 0
VOMITING: 0
SHORTNESS OF BREATH: 0
RHINORRHEA: 0
TROUBLE SWALLOWING: 0
ABDOMINAL PAIN: 0
FACIAL SWELLING: 1
CONSTIPATION: 0
APNEA: 0
BACK PAIN: 0
PHOTOPHOBIA: 0
CHEST TIGHTNESS: 0
SORE THROAT: 0

## 2020-10-17 NOTE — ED PROVIDER NOTES
and dry. Capillary Refill: Capillary refill takes less than 2 seconds. Neurological:      General: No focal deficit present. Mental Status: She is alert and oriented to person, place, and time. Cranial Nerves: No cranial nerve deficit. Sensory: No sensory deficit. Motor: No weakness or abnormal muscle tone. Psychiatric:         Mood and Affect: Mood normal.         Behavior: Behavior normal.          Procedures     MDM   This is a 65-year-old female with a history of hypothyroidism who presents to the emergency department with concerns of possible allergic reaction with lip swelling. Patient is awake alert, hemodynamic stable, afebrile and in no respiratory distress. She does have mild fred oral lip swelling. No involvement of the tongue or posterior pharynx. No signs of impending airway compromise, no stridor, no posterior pharynx involvement. Possible allergic reaction as she is also complaining of pruritic sensation. Administered Benadryl and Decadron. She had no progression of her reaction. She is not in any antihypertensives or other medications that could put her at high risk for angioedema. More likely allergic reaction. No signs of anaphylaxis at this point. She is observed in the ED and felt that she was mild improved after treatment. Prescribed her Benadryl and prednisone. Long discussion about return precautions and she understands and agrees with plan for discharge. Instructed her to stay away from new medication and allergen that may have contributed to her lip swelling including yogurt a generic NyQuil and iron supplements which she had all started in the last week. ED Course as of Oct 17 6038   Sat Oct 17, 2020   4410 ATTENDING PROVIDER ATTESTATION:     I have personally performed and/or participated in the history, exam, medical decision making, and procedures and agree with all pertinent clinical information unless otherwise noted.       I have also Surgical History:  has a past surgical history that includes Tubal ligation; [place] tooth extraction; Colonoscopy; Thyroidectomy (N/A, 7/1/2019); Colonoscopy (N/A, 6/8/2020); and Hemorrhoid surgery (N/A, 6/8/2020). Social History:  reports that she has never smoked. She has never used smokeless tobacco. She reports current alcohol use. She reports that she does not use drugs. Family History: family history is not on file. The patients home medications have been reviewed. Allergies: Seasonal    -------------------------------------------------- RESULTS -------------------------------------------------  Labs:  No results found for this visit on 10/17/20. Radiology:  No orders to display       ------------------------- NURSING NOTES AND VITALS REVIEWED ---------------------------  Date / Time Roomed:  10/17/2020  8:41 AM  ED Bed Assignment:  12/12    The nursing notes within the ED encounter and vital signs as below have been reviewed. /85   Pulse 63   Temp 97.1 °F (36.2 °C) (Temporal)   Resp 18   Ht 5' 5\" (1.651 m)   Wt 260 lb (117.9 kg)   LMP 09/25/2020   SpO2 99%   BMI 43.27 kg/m²   Oxygen Saturation Interpretation: normal      ------------------------------------------ PROGRESS NOTES ------------------------------------------    I have spoken with the patient and discussed todays results, in addition to providing specific details for the plan of care and counseling regarding the diagnosis and prognosis. Their questions are answered at this time and they are agreeable with the plan. I discussed at length with them reasons for immediate return here for re evaluation. They will followup with their PCP by calling their office tomorrow. --------------------------------- ADDITIONAL PROVIDER NOTES ---------------------------------  At this time the patient is without objective evidence of an acute process requiring hospitalization or inpatient management.   They have remained hemodynamically stable throughout their entire ED visit and are stable for discharge with outpatient follow-up. The plan has been discussed in detail and they are aware of the specific conditions for emergent return, as well as the importance of follow-up. Discharge Medication List as of 10/17/2020 11:04 AM      START taking these medications    Details   diphenhydrAMINE (BENADRYL ALLERGY) 25 MG capsule Take 1 capsule by mouth every 6 hours as needed for Itching (swelling), Disp-20 capsule,R-0Print      predniSONE (DELTASONE) 50 MG tablet Take 1 tablet by mouth daily for 5 days, Disp-5 tablet,R-0Print             Diagnosis:  1. Allergic reaction, initial encounter    2. Lip swelling        Disposition:  Patient's disposition: Discharge to home  Patient's condition is stable.        Marycarmendesire Goff DO  Resident  10/17/20 [id number]

## 2020-11-16 ENCOUNTER — HOSPITAL ENCOUNTER (EMERGENCY)
Age: 40
Discharge: HOME | End: 2020-11-16
Attending: EMERGENCY MEDICINE
Payer: COMMERCIAL

## 2020-11-16 VITALS
TEMPERATURE: 98.4 F | WEIGHT: 253 LBS | HEART RATE: 88 BPM | HEIGHT: 66 IN | SYSTOLIC BLOOD PRESSURE: 144 MMHG | OXYGEN SATURATION: 98 % | DIASTOLIC BLOOD PRESSURE: 92 MMHG | RESPIRATION RATE: 20 BRPM | BODY MASS INDEX: 40.66 KG/M2

## 2020-11-16 PROCEDURE — 6370000000 HC RX 637 (ALT 250 FOR IP): Performed by: EMERGENCY MEDICINE

## 2020-11-16 PROCEDURE — 6360000002 HC RX W HCPCS: Performed by: EMERGENCY MEDICINE

## 2020-11-16 PROCEDURE — 99284 EMERGENCY DEPT VISIT MOD MDM: CPT

## 2020-11-16 RX ORDER — DIPHENHYDRAMINE HCL 25 MG
25 TABLET ORAL EVERY 6 HOURS PRN
Status: DISCONTINUED | OUTPATIENT
Start: 2020-11-16 | End: 2020-11-16 | Stop reason: HOSPADM

## 2020-11-16 RX ORDER — DEXAMETHASONE SODIUM PHOSPHATE 10 MG/ML
10 INJECTION INTRAMUSCULAR; INTRAVENOUS ONCE
Status: COMPLETED | OUTPATIENT
Start: 2020-11-16 | End: 2020-11-16

## 2020-11-16 RX ADMIN — DIPHENHYDRAMINE HCL 25 MG: 25 TABLET ORAL at 08:33

## 2020-11-16 RX ADMIN — DEXAMETHASONE SODIUM PHOSPHATE 10 MG: 10 INJECTION INTRAMUSCULAR; INTRAVENOUS at 08:34

## 2020-11-16 ASSESSMENT — ENCOUNTER SYMPTOMS
TROUBLE SWALLOWING: 0
FACIAL SWELLING: 1
ALLERGIC REACTION: 1
SHORTNESS OF BREATH: 0
GASTROINTESTINAL NEGATIVE: 1

## 2020-11-16 NOTE — ED PROVIDER NOTES
The history is provided by the patient. Allergic Reaction   Presenting symptoms: swelling    Presenting symptoms: no difficulty swallowing    Presenting symptoms comment:  Left upper lip and left eyelid  Severity:  Mild  Prior episodes: prior episodes without etiology. Relieved by:  Nothing  Worsened by:  Nothing  Ineffective treatments:  None tried      Review of Systems   Constitutional: Negative. HENT: Positive for facial swelling. Negative for trouble swallowing. Respiratory: Negative for shortness of breath. Cardiovascular: Negative. Gastrointestinal: Negative. Musculoskeletal: Negative. Skin: Negative. Neurological: Negative. Physical Exam  Constitutional:       Appearance: Normal appearance. She is normal weight. HENT:      Head: Normocephalic and atraumatic. Nose: Nose normal.      Mouth/Throat:      Mouth: Mucous membranes are moist.      Pharynx: Oropharynx is clear. Comments: Mild edema of the left upper lip  Eyes:      Conjunctiva/sclera: Conjunctivae normal.      Pupils: Pupils are equal, round, and reactive to light. Comments: Mild edema of the left upper eyelid   Cardiovascular:      Rate and Rhythm: Normal rate and regular rhythm. Pulses: Normal pulses. Heart sounds: Normal heart sounds. Pulmonary:      Effort: Pulmonary effort is normal.      Breath sounds: Normal breath sounds. Abdominal:      General: Abdomen is flat. Bowel sounds are normal.   Musculoskeletal: Normal range of motion. Skin:     General: Skin is warm. Capillary Refill: Capillary refill takes less than 2 seconds. Findings: No rash. Neurological:      General: No focal deficit present. Mental Status: She is alert. Mental status is at baseline. Psychiatric:         Mood and Affect: Mood normal.         Behavior: Behavior normal.         Thought Content:  Thought content normal.         Judgment: Judgment normal.         Procedures    MDM --------------------------------------------- PAST HISTORY ---------------------------------------------  Past Medical History:  has a past medical history of Environmental allergies and Thyroid disease. Past Surgical History:  has a past surgical history that includes Tubal ligation; [place] tooth extraction; Colonoscopy; Thyroidectomy (N/A, 7/1/2019); Colonoscopy (N/A, 6/8/2020); and Hemorrhoid surgery (N/A, 6/8/2020). Social History:  reports that she has never smoked. She has never used smokeless tobacco. She reports current alcohol use. She reports that she does not use drugs. Family History: family history is not on file. The patients home medications have been reviewed. Allergies: Seasonal    -------------------------------------------------- RESULTS -------------------------------------------------  Labs:  No results found for this visit on 11/16/20. Radiology:  No orders to display       ------------------------- NURSING NOTES AND VITALS REVIEWED ---------------------------  Date / Time Roomed:  11/16/2020  7:48 AM  ED Bed Assignment:  23/23    The nursing notes within the ED encounter and vital signs as below have been reviewed. /83   Pulse 95   Temp 98.4 °F (36.9 °C) (Oral)   Resp 18   SpO2 98%   Oxygen Saturation Interpretation: Normal      ------------------------------------------ PROGRESS NOTES ------------------------------------------  I have spoken with the patient and discussed todays results, in addition to providing specific details for the plan of care and counseling regarding the diagnosis and prognosis. Their questions are answered at this time and they are agreeable with the plan. I discussed at length with them reasons for immediate return here for re evaluation. They will followup with primary care by calling their office tomorrow.     Due to the recurrent nature of her symptoms, I have advised her to see an allergist.  She understands reasons to return to the ED.  --------------------------------- ADDITIONAL PROVIDER NOTES ---------------------------------  At this time the patient is without objective evidence of an acute process requiring hospitalization or inpatient management. They have remained hemodynamically stable throughout their entire ED visit and are stable for discharge with outpatient follow-up. The plan has been discussed in detail and they are aware of the specific conditions for emergent return, as well as the importance of follow-up. New Prescriptions    No medications on file       Diagnosis:  1. Allergic reaction, initial encounter        Disposition:  Patient's disposition: Discharge to home  Patient's condition is stable.          Gela Borrego,   11/16/20 [id number]

## 2020-12-21 ENCOUNTER — HOSPITAL ENCOUNTER (OUTPATIENT)
Dept: MAMMOGRAPHY | Age: 40
Discharge: HOME | End: 2020-12-23
Payer: COMMERCIAL

## 2020-12-21 PROCEDURE — 77063 BREAST TOMOSYNTHESIS BI: CPT

## 2021-11-09 ENCOUNTER — HOSPITAL ENCOUNTER (EMERGENCY)
Age: 41
Discharge: HOME | End: 2021-11-09
Payer: COMMERCIAL

## 2021-11-09 VITALS
DIASTOLIC BLOOD PRESSURE: 93 MMHG | BODY MASS INDEX: 40.66 KG/M2 | SYSTOLIC BLOOD PRESSURE: 171 MMHG | TEMPERATURE: 98.2 F | OXYGEN SATURATION: 99 % | HEIGHT: 66 IN | HEART RATE: 96 BPM | WEIGHT: 253 LBS | RESPIRATION RATE: 18 BRPM

## 2021-11-09 DIAGNOSIS — J06.9 ACUTE UPPER RESPIRATORY INFECTION: Primary | ICD-10-CM

## 2021-11-09 LAB — STREP GRP A PCR: NEGATIVE

## 2021-11-09 PROCEDURE — U0003 INFECTIOUS AGENT DETECTION BY NUCLEIC ACID (DNA OR RNA); SEVERE ACUTE RESPIRATORY SYNDROME CORONAVIRUS 2 (SARS-COV-2) (CORONAVIRUS DISEASE [COVID-19]), AMPLIFIED PROBE TECHNIQUE, MAKING USE OF HIGH THROUGHPUT TECHNOLOGIES AS DESCRIBED BY CMS-2020-01-R: HCPCS

## 2021-11-09 PROCEDURE — U0005 INFEC AGEN DETEC AMPLI PROBE: HCPCS

## 2021-11-09 PROCEDURE — 99282 EMERGENCY DEPT VISIT SF MDM: CPT

## 2021-11-09 PROCEDURE — 87880 STREP A ASSAY W/OPTIC: CPT

## 2021-11-09 NOTE — ED PROVIDER NOTES
[facility]   ED  Encounter Note  Admit Date/RoomTime: 2021 11:47 AM  ED Room: [place] [address]/[facility]    NAME: Jurgen Mcmullen  : 1980  MRN: [id number]     Chief Complaint:  URI (cough congestion, bilateral ear pain-onset 2 fays-possibly exposed to covid)    History of Present Illness       Jurgen Mcmullen is a 42 y.o. old female who presents to the emergency department by private vehicle, for sore throat and cough, which began 2 day(s) prior to arrival.  Since onset the symptoms have been persistent and mild in severity. She denies vomiting or diarrhea. She c/o b/l ear pain. She denies fevers or chills. She had possible exposure to COVID. ROS   Pertinent positives and negatives are stated within HPI, all other systems reviewed and are negative. Past Medical History:  has a past medical history of Environmental allergies and Thyroid disease. Surgical History:  has a past surgical history that includes Tubal ligation; [place] tooth extraction; Colonoscopy; Thyroidectomy (N/A, 2019); Colonoscopy (N/A, 2020); and Hemorrhoid surgery (N/A, 2020). Social History:  reports that she has never smoked. She has never used smokeless tobacco. She reports current alcohol use. She reports that she does not use drugs. Family History: family history is not on file. Allergies: Seasonal    Physical Exam   Oxygen Saturation Interpretation: Normal on room air analysis. ED Triage Vitals [21 0904]   BP Temp Temp Source Pulse Resp SpO2 Height Weight   -- 98.2 °F (36.8 °C) Tympanic 96 18 99 % 5' 6\" (1.676 m) 253 lb (114.8 kg)         · Constitutional:  Alert, development consistent with age. · Ears:  External Ears: Bilateral normal, mastoid tenderness absent b/l.               TM's & External Canals: normal TM's and external ear canals bilaterally. · Nose:   There is no discharge, swelling or lesions noted.   · Sinuses: no Bilateral maxillary sinus tenderness. no Bilateral frontal sinus tenderness. · Mouth:  normal tongue and buccal mucosa. · Throat: mild erythema, throat culture taken and mucous membranes moist.  Airway Patent. Uvula is midline. Patient swallowing w/o difficulty. No sign of peritonsillar or retropharyngeal abscess. · Neck:  Supple. No meningeal signs. There is no  anterior cervical and posterior cervical node tenderness. · Respiratory:   Breath sounds: Bilateral normal.  Lung sounds: normal.   · CV:  Regular rate and rhythm, normal heart sounds, without pathological murmurs, ectopy, gallops, or rubs. · GI:  Abdomen Soft, nontender, good bowel sounds. No firm or pulsatile mass. · Integument:  Normal turgor. Warm, dry, without visible rash. · Neurological:  Oriented. Motor functions intact. Lab / Imaging Results   (All laboratory and radiology results have been personally reviewed by myself)  Labs:  Results for orders placed or performed during the hospital encounter of 11/09/21   Strep Screen Group A Throat    Specimen: Throat   Result Value Ref Range    Strep Grp A PCR Negative Negative       Imaging: All Radiology results interpreted by Radiologist unless otherwise noted. No orders to display       ED Course / Medical Decision Making   Medications - No data to display     Re-examination:  11/9/21       Time: 1145   Patient updated on results. Consults:   None    Procedures:   none    Medical Decision Making:   Strep is negative. Patient appears well. Not hypoxic, no respiratory distress. Advised to return to the ER If worse in any way. Advised to quarantine until COVID results are negative. Otherwise to f/uw /PCP. Assessment     1. Acute upper respiratory infection      Plan   Discharged home.   Patient condition is good    New Medications     Discharge Medication List as of 11/9/2021 11:50 AM        Electronically signed by MARBELLA Hamilton   DD: 11/9/21  **This report was transcribed using voice recognition software. Every effort was made to ensure accuracy; however, inadvertent computerized transcription errors may be present.   END OF ED PROVIDER NOTE         Trae Damon, [address]  11/09/21 [id number]

## 2021-11-10 LAB — SARS-COV-2, PCR: DETECTED

## 2021-11-19 ENCOUNTER — APPOINTMENT (OUTPATIENT)
Dept: CT IMAGING | Age: 41
End: 2021-11-19
Payer: COMMERCIAL

## 2021-11-19 ENCOUNTER — HOSPITAL ENCOUNTER (EMERGENCY)
Age: 41
Discharge: HOME | End: 2021-11-19
Attending: STUDENT IN AN ORGANIZED HEALTH CARE EDUCATION/TRAINING PROGRAM
Payer: COMMERCIAL

## 2021-11-19 ENCOUNTER — APPOINTMENT (OUTPATIENT)
Dept: GENERAL RADIOLOGY | Age: 41
End: 2021-11-19
Payer: COMMERCIAL

## 2021-11-19 VITALS
TEMPERATURE: 96.6 F | DIASTOLIC BLOOD PRESSURE: 77 MMHG | OXYGEN SATURATION: 97 % | RESPIRATION RATE: 19 BRPM | SYSTOLIC BLOOD PRESSURE: 135 MMHG | HEART RATE: 110 BPM

## 2021-11-19 DIAGNOSIS — U07.1 COVID: Primary | ICD-10-CM

## 2021-11-19 LAB
ANION GAP SERPL CALCULATED.3IONS-SCNC: 11 MMOL/L (ref 7–16)
BASOPHILS ABSOLUTE: 0 E9/L (ref 0–0.2)
BASOPHILS RELATIVE PERCENT: 0 % (ref 0–2)
BUN BLDV-MCNC: 9 MG/DL (ref 6–20)
CALCIUM SERPL-MCNC: 8.6 MG/DL (ref 8.6–10.2)
CHLORIDE BLD-SCNC: 104 MMOL/L (ref 98–107)
CO2: 23 MMOL/L (ref 22–29)
CREAT SERPL-MCNC: 0.7 MG/DL (ref 0.5–1)
D DIMER: 251 NG/ML DDU
EKG ATRIAL RATE: 94 BPM
EKG P AXIS: 52 DEGREES
EKG P-R INTERVAL: 136 MS
EKG Q-T INTERVAL: 338 MS
EKG QRS DURATION: 76 MS
EKG QTC CALCULATION (BAZETT): 422 MS
EKG R AXIS: 19 DEGREES
EKG T AXIS: 2 DEGREES
EKG VENTRICULAR RATE: 94 BPM
EOSINOPHILS ABSOLUTE: 0 E9/L (ref 0.05–0.5)
EOSINOPHILS RELATIVE PERCENT: 0 % (ref 0–6)
GFR AFRICAN AMERICAN: >60
GFR NON-AFRICAN AMERICAN: >60 ML/MIN/1.73
GLUCOSE BLD-MCNC: 107 MG/DL (ref 74–99)
HCG, URINE, POC: NEGATIVE
HCT VFR BLD CALC: 40.2 % (ref 34–48)
HEMOGLOBIN: 13 G/DL (ref 11.5–15.5)
HYPOCHROMIA: ABNORMAL
LYMPHOCYTES ABSOLUTE: 1.22 E9/L (ref 1.5–4)
LYMPHOCYTES RELATIVE PERCENT: 37 % (ref 20–42)
Lab: NORMAL
MCH RBC QN AUTO: 24.9 PG (ref 26–35)
MCHC RBC AUTO-ENTMCNC: 32.3 % (ref 32–34.5)
MCV RBC AUTO: 77 FL (ref 80–99.9)
MONOCYTES ABSOLUTE: 0.43 E9/L (ref 0.1–0.95)
MONOCYTES RELATIVE PERCENT: 13 % (ref 2–12)
NEGATIVE QC PASS/FAIL: NORMAL
NEUTROPHILS ABSOLUTE: 1.65 E9/L (ref 1.8–7.3)
NEUTROPHILS RELATIVE PERCENT: 50 % (ref 43–80)
PDW BLD-RTO: 14.4 FL (ref 11.5–15)
PLATELET # BLD: 247 E9/L (ref 130–450)
PMV BLD AUTO: 9.7 FL (ref 7–12)
POSITIVE QC PASS/FAIL: NORMAL
POTASSIUM REFLEX MAGNESIUM: 4.3 MMOL/L (ref 3.5–5)
RBC # BLD: 5.22 E12/L (ref 3.5–5.5)
SODIUM BLD-SCNC: 138 MMOL/L (ref 132–146)
TROPONIN, HIGH SENSITIVITY: <6 NG/L (ref 0–9)
WBC # BLD: 3.3 E9/L (ref 4.5–11.5)

## 2021-11-19 PROCEDURE — 96374 THER/PROPH/DIAG INJ IV PUSH: CPT

## 2021-11-19 PROCEDURE — 84484 ASSAY OF TROPONIN QUANT: CPT

## 2021-11-19 PROCEDURE — 80048 BASIC METABOLIC PNL TOTAL CA: CPT

## 2021-11-19 PROCEDURE — 99283 EMERGENCY DEPT VISIT LOW MDM: CPT

## 2021-11-19 PROCEDURE — 6360000002 HC RX W HCPCS: Performed by: STUDENT IN AN ORGANIZED HEALTH CARE EDUCATION/TRAINING PROGRAM

## 2021-11-19 PROCEDURE — 71046 X-RAY EXAM CHEST 2 VIEWS: CPT

## 2021-11-19 PROCEDURE — 6360000004 HC RX CONTRAST MEDICATION: Performed by: RADIOLOGY

## 2021-11-19 PROCEDURE — 71275 CT ANGIOGRAPHY CHEST: CPT

## 2021-11-19 PROCEDURE — 93005 ELECTROCARDIOGRAM TRACING: CPT | Performed by: NURSE PRACTITIONER

## 2021-11-19 PROCEDURE — 94664 DEMO&/EVAL PT USE INHALER: CPT

## 2021-11-19 PROCEDURE — 85025 COMPLETE CBC W/AUTO DIFF WBC: CPT

## 2021-11-19 PROCEDURE — 85378 FIBRIN DEGRADE SEMIQUANT: CPT

## 2021-11-19 RX ORDER — KETOROLAC TROMETHAMINE 15 MG/ML
15 INJECTION, SOLUTION INTRAMUSCULAR; INTRAVENOUS ONCE
Status: COMPLETED | OUTPATIENT
Start: 2021-11-19 | End: 2021-11-19

## 2021-11-19 RX ORDER — ALBUTEROL SULFATE 90 UG/1
2 AEROSOL, METERED RESPIRATORY (INHALATION) 4 TIMES DAILY PRN
Qty: 18 G | Refills: 0 | Status: SHIPPED | OUTPATIENT
Start: 2021-11-19

## 2021-11-19 RX ORDER — BENZONATATE 100 MG/1
100 CAPSULE ORAL 3 TIMES DAILY PRN
Qty: 21 CAPSULE | Refills: 0 | Status: SHIPPED | OUTPATIENT
Start: 2021-11-19 | End: 2021-11-26

## 2021-11-19 RX ADMIN — ALBUTEROL SULFATE 2.5 MG: 2.5 SOLUTION RESPIRATORY (INHALATION) at 16:55

## 2021-11-19 RX ADMIN — KETOROLAC TROMETHAMINE 15 MG: 15 INJECTION, SOLUTION INTRAMUSCULAR; INTRAVENOUS at 18:44

## 2021-11-19 RX ADMIN — IOPAMIDOL 75 ML: 755 INJECTION, SOLUTION INTRAVENOUS at 15:55

## 2021-11-19 ASSESSMENT — PAIN SCALES - GENERAL: PAINLEVEL_OUTOF10: 1

## 2021-11-19 NOTE — Clinical Note
Rosalino Macdonald was seen and treated in our emergency department on 11/19/2021. She may return to work on 11/25/2021. May return to work if you do not have fever and symptoms have improved     If you have any questions or concerns, please don't hesitate to call.       Roxy Falcon MD

## 2021-11-19 NOTE — ED TRIAGE NOTES
FIRST PROVIDER CONTACT ASSESSMENT NOTE           Department of Emergency Medicine                 First Provider Note            21  12:10 PM EST    Date of Encounter: No admission date for patient encounter. Patient Name: Diane Hancock  : 1980  MRN: [id number]    Chief Complaint: Shortness of Breath (Cov +. CO SOB. )      History of Present Illness:   Diane Hancock is a 42 y.o. female who presents to the ED for covid 19, tested positive 21. Complains of shortness of breath and cough. Denies fever or chills. Did not receive covid vaccine. Focused Physical Exam:  VS:    ED Triage Vitals [21 1133]   BP Temp Temp Source Pulse Resp SpO2 Height Weight   -- 96.6 °F (35.9 °C) Temporal 108 -- 96 % -- --        Physical Ex: Constitutional: Alert and non-toxic. Medical History:  has a past medical history of Environmental allergies and Thyroid disease. Surgical History:  has a past surgical history that includes Tubal ligation; [place] tooth extraction; Colonoscopy; Thyroidectomy (N/A, 2019); Colonoscopy (N/A, 2020); and Hemorrhoid surgery (N/A, 2020). Social History:  reports that she has never smoked. She has never used smokeless tobacco. She reports current alcohol use. She reports that she does not use drugs. Family History: family history is not on file.     Allergies: Seasonal     Initial Plan of Care: Initiate Treatment-Testing, Proceed toTreatment Area When Bed Available for ED Attending/MLP to Continue Care      ---END OF FIRST PROVIDER CONTACT ASSESSMENT NOTE---  Electronically signed by IVÁN Reyez CNP   DD: 21

## 2021-11-19 NOTE — ED NOTES
Bed: 16  Expected date:   Expected time:   Means of arrival:   Comments:  Terminal or cov +     Annabelle Hodgkins, RN  11/19/21 3604

## 2021-11-19 NOTE — Clinical Note
Justin Orozco was seen and treated in our emergency department on 11/19/2021. She may return to work on 11/25/2021. May return to work if you do not have fever and symptoms have improved     If you have any questions or concerns, please don't hesitate to call.       Sherley Kennedy MD

## 2021-11-19 NOTE — ED PROVIDER NOTES
Bob Escudero is a 42year old female with PMH of HLD who presented to ED with concern for shortness of breath. Patient was diagnosed with COVID 11/9/21 patient has had shortness of breath and has had worsening of symptoms over the past 10 days. Patient is still having fevers and chills. Patient denies chest pain, nausea, vomiting, abdominal pain. Patient has not tried anything for symptoms and nothing makes symptoms better or worse. Symptoms mild and constant. The history is provided by the patient and medical records. Review of Systems   Constitutional: Positive for chills, fatigue and fever. Negative for diaphoresis. Eyes: Negative for photophobia and visual disturbance. Respiratory: Positive for cough and shortness of breath. Negative for chest tightness. Cardiovascular: Negative for chest pain, palpitations and leg swelling. Gastrointestinal: Negative for abdominal distention, abdominal pain, diarrhea, nausea and vomiting. Genitourinary: Negative for dysuria. Musculoskeletal: Negative for neck pain and neck stiffness. Skin: Negative for pallor and rash. Neurological: Negative for headaches. Psychiatric/Behavioral: Negative for confusion. Physical Exam  Vitals and nursing note reviewed. Constitutional:       General: She is not in acute distress. Appearance: Normal appearance. She is not ill-appearing. HENT:      Head: Normocephalic and atraumatic. Eyes:      General: No scleral icterus. Conjunctiva/sclera: Conjunctivae normal.      Pupils: Pupils are equal, round, and reactive to light. Cardiovascular:      Rate and Rhythm: Regular rhythm. Tachycardia present. Pulmonary:      Effort: Pulmonary effort is normal.      Breath sounds: Normal breath sounds. Abdominal:      General: Bowel sounds are normal. There is no distension. Palpations: Abdomen is soft. Tenderness: There is no abdominal tenderness. There is no guarding or rebound. Musculoskeletal:      Cervical back: Normal range of motion and neck supple. No rigidity. No muscular tenderness. Right lower leg: No edema. Left lower leg: No edema. Skin:     General: Skin is warm and dry. Capillary Refill: Capillary refill takes less than 2 seconds. Coloration: Skin is not pale. Findings: No erythema or rash. Neurological:      Mental Status: She is alert and oriented to person, place, and time. Psychiatric:         Mood and Affect: Mood normal.          Procedures     MDM  Number of Diagnoses or Management Options  COVID  Diagnosis management comments: Kiara Macdonald is a 42year old female who presented to ED with concern for shortness of breath and cough. Patient was known to have [place] patient did have elevated d dimer, CTA was ordered and patient was found to not have central PE but could not evaluate for subsegmental PE. Patient was ambulated and was not hypoxic patient was not tachycardic at time of re-evaluation not in any distress. Patient did not want repeat CTA at this time but will monitor symptoms closely and return to ED if symptoms worsen or return. Patient is agreeable to plan and well appearing at time of discharge. Patient given supportive care and advised to return to ED if symptoms worsen or return. ED Course as of 11/19/21 1855   Fri Nov 19, 2021   5143 Patient was not hypoxic with ambulation [SS]      ED Course User Index  [SS] Darrick Cross MD        EKG: This EKG is signed and interpreted by me.     Rate: 94  Rhythm: Sinus  Interpretation: non-specific EKG, no ST elevations or depressions  Comparison: stable as compared to patient's most recent EKG      ED Course as of 11/20/21 0012   Fri Nov 19, 2021   1847 Patient was not hypoxic with ambulation [SS]      ED Course User Index  [SS] Darrick Cross MD       --------------------------------------------- PAST HISTORY ---------------------------------------------  Past Medical History:  has a past medical history of Environmental allergies and Thyroid disease. Past Surgical History:  has a past surgical history that includes Tubal ligation; [place] tooth extraction; Colonoscopy; Thyroidectomy (N/A, 7/1/2019); Colonoscopy (N/A, 6/8/2020); and Hemorrhoid surgery (N/A, 6/8/2020). Social History:  reports that she has never smoked. She has never used smokeless tobacco. She reports current alcohol use. She reports that she does not use drugs. Family History: family history is not on file. The patients home medications have been reviewed.     Allergies: Seasonal    -------------------------------------------------- RESULTS -------------------------------------------------  Labs:  Results for orders placed or performed during the hospital encounter of 11/19/21   CBC Auto Differential   Result Value Ref Range    WBC 3.3 (L) 4.5 - 11.5 E9/L    RBC 5.22 3.50 - 5.50 E12/L    Hemoglobin 13.0 11.5 - 15.5 g/dL    Hematocrit 40.2 34.0 - 48.0 %    MCV 77.0 (L) 80.0 - 99.9 fL    MCH 24.9 (L) 26.0 - 35.0 pg    MCHC 32.3 32.0 - 34.5 %    RDW 14.4 11.5 - 15.0 fL    Platelets 242 358 - 134 E9/L    MPV 9.7 7.0 - 12.0 fL    Neutrophils % 50.0 43.0 - 80.0 %    Lymphocytes % 37.0 20.0 - 42.0 %    Monocytes % 13.0 (H) 2.0 - 12.0 %    Eosinophils % 0.0 0.0 - 6.0 %    Basophils % 0.0 0.0 - 2.0 %    Neutrophils Absolute 1.65 (L) 1.80 - 7.30 E9/L    Lymphocytes Absolute 1.22 (L) 1.50 - 4.00 E9/L    Monocytes Absolute 0.43 0.10 - 0.95 E9/L    Eosinophils Absolute 0.00 (L) 0.05 - 0.50 E9/L    Basophils Absolute 0.00 0.00 - 0.20 E9/L    Hypochromia 2+    Basic Metabolic Panel w/ Reflex to MG   Result Value Ref Range    Sodium 138 132 - 146 mmol/L    Potassium reflex Magnesium 4.3 3.5 - 5.0 mmol/L    Chloride 104 98 - 107 mmol/L    CO2 23 22 - 29 mmol/L    Anion Gap 11 7 - 16 mmol/L    Glucose 107 (H) 74 - 99 mg/dL    BUN 9 6 - 20 mg/dL    CREATININE 0.7 0.5 - 1.0 mg/dL    GFR Non-African American >60 >=60 mL/min/1.73    GFR African American >60     Calcium 8.6 8.6 - 10.2 mg/dL   Troponin   Result Value Ref Range    Troponin, High Sensitivity <6 0 - 9 ng/L   D-Dimer, Quantitative   Result Value Ref Range    D-Dimer, Quant 251 ng/mL DDU   POC Pregnancy Urine Qual   Result Value Ref Range    HCG, Urine, POC Negative Negative    Lot Number [id number]     Positive QC Pass/Fail Pass     Negative QC Pass/Fail Pass    EKG 12 Lead   Result Value Ref Range    Ventricular Rate 94 BPM    Atrial Rate 94 BPM    P-R Interval 136 ms    QRS Duration 76 ms    Q-T Interval 338 ms    QTc Calculation (Bazett) 422 ms    P Axis 52 degrees    R Axis 19 degrees    T Axis 2 degrees       Radiology:  CTA CHEST W CONTRAST   Final Result   1. Multifocal bilateral ground-glass and semi solid pulmonary infiltrates   most consistent with COVID pneumonia   2. The timing of the contrast bolus is suboptimal.  Contrast has passed   through the pulmonary arteries and is seen within the left ventricle and   aorta. No gross central pulmonary embolus is noted. The segmental and   subsegmental pulmonary arteries are not adequately evaluated. XR CHEST (2 VW)   Final Result   Bilateral lower lobe infiltrates most consistent with atypical pneumonia as   might be expected for COVID infection. Tiny bilateral pleural effusions.             ------------------------- NURSING NOTES AND VITALS REVIEWED ---------------------------  Date / Time Roomed:  11/19/2021  2:11 PM  ED Bed Assignment:  16/16    The nursing notes within the ED encounter and vital signs as below have been reviewed.    /77   Pulse 110   Temp 96.6 °F (35.9 °C) (Temporal)   Resp 19   LMP 11/05/2021   SpO2 97%   Oxygen Saturation Interpretation: Normal      ------------------------------------------ PROGRESS NOTES ------------------------------------------  12:12 AM EST  I have spoken with the patient and discussed todays results, in addition to providing specific details for the plan of care and counseling regarding the diagnosis and prognosis. Their questions are answered at this time and they are agreeable with the plan. I discussed at length with them reasons for immediate return here for re evaluation. They will followup with their primary care physician by calling their office tomorrow. --------------------------------- ADDITIONAL PROVIDER NOTES ---------------------------------  At this time the patient is without objective evidence of an acute process requiring hospitalization or inpatient management. They have remained hemodynamically stable throughout their entire ED visit and are stable for discharge with outpatient follow-up. The plan has been discussed in detail and they are aware of the specific conditions for emergent return, as well as the importance of follow-up. Discharge Medication List as of 11/19/2021  6:56 PM      START taking these medications    Details   benzonatate (TESSALON PERLES) 100 MG capsule Take 1 capsule by mouth 3 times daily as needed for Cough, Disp-21 capsule, R-0Print      albuterol sulfate HFA (VENTOLIN HFA) 108 (90 Base) MCG/ACT inhaler Inhale 2 puffs into the lungs 4 times daily as needed for Wheezing, Disp-18 g, R-0Print             Diagnosis:  1. COVID        Disposition:  Patient's disposition: Discharge to home  Patient's condition is stable.        Rolando Mock MD  11/20/21 0578

## 2021-11-20 ASSESSMENT — ENCOUNTER SYMPTOMS
CHEST TIGHTNESS: 0
DIARRHEA: 0
ABDOMINAL DISTENTION: 0
SHORTNESS OF BREATH: 1
NAUSEA: 0
COUGH: 1
PHOTOPHOBIA: 0
VOMITING: 0
ABDOMINAL PAIN: 0

## 2021-11-22 ENCOUNTER — OFFICE VISIT (OUTPATIENT)
Dept: FAMILY MEDICINE CLINIC | Age: 41
End: 2021-11-22
Payer: COMMERCIAL

## 2021-11-22 ENCOUNTER — CARE COORDINATION (OUTPATIENT)
Dept: CARE COORDINATION | Age: 41
End: 2021-11-22

## 2021-11-22 VITALS
HEART RATE: 86 BPM | WEIGHT: 253 LBS | HEIGHT: 66 IN | SYSTOLIC BLOOD PRESSURE: 123 MMHG | BODY MASS INDEX: 40.66 KG/M2 | TEMPERATURE: 97.2 F | DIASTOLIC BLOOD PRESSURE: 83 MMHG | OXYGEN SATURATION: 98 % | RESPIRATION RATE: 17 BRPM

## 2021-11-22 DIAGNOSIS — H92.03 EAR PAIN, BILATERAL: Primary | ICD-10-CM

## 2021-11-22 PROCEDURE — 99212 OFFICE O/P EST SF 10 MIN: CPT | Performed by: STUDENT IN AN ORGANIZED HEALTH CARE EDUCATION/TRAINING PROGRAM

## 2021-11-22 RX ORDER — PREDNISONE 20 MG/1
40 TABLET ORAL DAILY
Qty: 20 TABLET | Refills: 0 | Status: SHIPPED | OUTPATIENT
Start: 2021-11-22 | End: 2021-12-02

## 2021-11-22 SDOH — ECONOMIC STABILITY: FOOD INSECURITY: WITHIN THE PAST 12 MONTHS, THE FOOD YOU BOUGHT JUST DIDN'T LAST AND YOU DIDN'T HAVE MONEY TO GET MORE.: NEVER TRUE

## 2021-11-22 SDOH — ECONOMIC STABILITY: FOOD INSECURITY: WITHIN THE PAST 12 MONTHS, YOU WORRIED THAT YOUR FOOD WOULD RUN OUT BEFORE YOU GOT MONEY TO BUY MORE.: NEVER TRUE

## 2021-11-22 ASSESSMENT — SOCIAL DETERMINANTS OF HEALTH (SDOH): HOW HARD IS IT FOR YOU TO PAY FOR THE VERY BASICS LIKE FOOD, HOUSING, MEDICAL CARE, AND HEATING?: NOT HARD AT ALL

## 2021-11-22 ASSESSMENT — PATIENT HEALTH QUESTIONNAIRE - PHQ9
SUM OF ALL RESPONSES TO PHQ9 QUESTIONS 1 & 2: 0
SUM OF ALL RESPONSES TO PHQ QUESTIONS 1-9: 0
SUM OF ALL RESPONSES TO PHQ QUESTIONS 1-9: 0
2. FEELING DOWN, DEPRESSED OR HOPELESS: 0
SUM OF ALL RESPONSES TO PHQ QUESTIONS 1-9: 0
1. LITTLE INTEREST OR PLEASURE IN DOING THINGS: 0

## 2021-11-22 NOTE — CARE COORDINATION
Patient contacted regarding COVID-19 diagnosis and pulse oximeter ordered at discharge. Discussed COVID-19 related testing which was available at this time. Test results were positive. Patient informed of results, if available? Yes. Ambulatory Care Manager contacted the patient by telephone to perform post discharge assessment. Call within 2 business days of discharge: No. Verified name and  with patient as identifiers. Provided introduction to self, and explanation of the CTN/ACM role, and reason for call due to risk factors for infection and/or exposure to COVID-19. Symptoms reviewed with patient who verbalized the following symptoms: cough, shortness of breath, no new symptoms, no worsening symptoms and nasal congestion and ear pain. Due to no new or worsening symptoms encounter was not routed to provider for escalation. Discussed follow-up appointments. If no appointment was previously scheduled, appointment scheduling offered: Yes. [facility] follow up appointment(s): No future appointments. [facility] follow up appointment(s): Lee Lindsey states her PCP will not see her because she has COVID-19. [facility] and phone number. Non-face-to-face services provided:  Obtained and reviewed discharge summary and/or continuity of care documents     Advance Care Planning:   Does patient have an Advance Directive:  reviewed and current. Educated patient about risk for severe COVID-19 due to risk factors according to CDC guidelines. ACM reviewed discharge instructions, medical action plan and red flag symptoms with the patient who verbalized understanding. Discussed COVID vaccination status: Yes. Education provided on COVID-19 vaccination as appropriate. Discussed exposure protocols and quarantine with CDC Guidelines.  Patient was given an opportunity to verbalize any questions and concerns and agrees to contact ACM or health care provider for questions related to their healthcare. Reviewed and educated patient on any new and changed medications related to discharge diagnosis     Was patient discharged with a pulse oximeter? Yes Discussed and confirmed pulse oximeter discharge instructions and when to notify provider or seek emergency care. Patient reports SPO2 95-98% on room air. Varsha Sledge denies any fever. She reports she is taking the albuterol inhaler and benzonatate as prescribed. She is active with MyChart. ACM provided contact information. Plan for follow-up call in 5-7 days based on severity of symptoms and risk factors.

## 2021-11-23 NOTE — PROGRESS NOTES
Chief Complaint       Otalgia (both ears ringing and hurting been going on since the 9th )      History of Present Illness   Source of history provided by:  Patient       Paieg Quezada is a 42 y.o. female who  has a past medical history of Environmental allergies and Thyroid disease. presenting to the walk in clinic for evaluation of BL ear pain which has been present since November 9th. Patient was diagnosed with COVID then. She denies any vertigo, hearing loss, new medications or drainage from the ear. Patient has not been taking anything for pain. ROS    Unless otherwise stated in this report or unable to obtain because of the patient's clinical or mental status as evidenced by the medical record, this patients's positive and negative responses for Review of Systems, constitutional, psych, eyes, ENT, cardiovascular, respiratory, gastrointestinal, neurological, genitourinary, musculoskeletal, integument systems and systems related to the presenting problem are either stated in the preceding or were not pertinent or were negative for the symptoms and/or complaints related to the medical problem. Past Medical History:  has a past medical history of Environmental allergies and Thyroid disease. Past Surgical History:  has a past surgical history that includes Tubal ligation; [place] tooth extraction; Colonoscopy; Thyroidectomy (N/A, 7/1/2019); Colonoscopy (N/A, 6/8/2020); and Hemorrhoid surgery (N/A, 6/8/2020). Social History:  reports that she has never smoked. She has never used smokeless tobacco. She reports current alcohol use. She reports that she does not use drugs. Family History: family history is not on file.    Allergies: Seasonal    Physical Exam         VS:  /83 (Site: Left Upper Arm, Position: Sitting, Cuff Size: Medium Adult)   Pulse 86   Temp 97.2 °F (36.2 °C) (Temporal)   Resp 17   Ht 5' 6\" (1.676 m)   Wt 253 lb (114.8 kg)   LMP 11/05/2021   SpO2 98%   BMI 40.84 kg/m² Oxygen Saturation Interpretation: normal    Constitutional:  Alert, development consistent with age. Eyes:  PERRL, EOMI, no discharge or conjunctival injection. Ears:  External ears without lesions. TM's clear without erythema or perforation bilaterally. Throat:  Pharynx without injection, exudate, or tonsillar hypertrophy. Airway patient. Neck:  Normal ROM. Supple. Lungs:  Clear to auscultation and breath sounds equal.  Heart:  Regular rate and rhythm, normal heart sounds, without pathological murmurs, ectopy, gallops, or rubs. Abdomen:  Soft, nontender, good bowel sounds. No firm or pulsatile mass. Back:  No costovertebral tenderness. Skin:  Normal turgor. Warm, dry, without visible rash, unless noted elsewhere. Neurological:  Alert and oriented. Motor functions intact. Lab / Imaging Results   (All laboratory and radiology results have been personally reviewed by myself)  Labs:  No results found for this visit on 11/22/21. Imaging: All Radiology results interpreted by Radiologist unless otherwise noted. Assessment / Plan     Impression(s):  Maeve was seen today for otalgia. Diagnoses and all orders for this visit:    Ear pain, bilateral  -     predniSONE (DELTASONE) 20 MG tablet; Take 2 tablets by mouth daily for 10 days    Suspect ear pain is related to recent COVID infection. There are care reports of patients with tinnitus and ear pain following COVID 19 infection. Will try prednisone. If no improvement, would advise patient to see ENT. Disposition:  Disposition: Discharge to home    Pt advised to f/u with PCP in 5-7 days for continued management and further care. ER immediately if symptoms worsen or change. Red flag symptoms discussed. Patient states understanding is in agreement with this care plan. All questions answered.     Kandace Marshall MD

## 2021-11-30 ENCOUNTER — CARE COORDINATION (OUTPATIENT)
Dept: CARE COORDINATION | Age: 41
End: 2021-11-30

## 2021-12-29 ENCOUNTER — HOSPITAL ENCOUNTER (OUTPATIENT)
Age: 41
Discharge: HOME | End: 2021-12-31
Payer: COMMERCIAL

## 2021-12-29 ENCOUNTER — HOSPITAL ENCOUNTER (OUTPATIENT)
Dept: GENERAL RADIOLOGY | Age: 41
Discharge: HOME | End: 2021-12-31
Payer: COMMERCIAL

## 2021-12-29 DIAGNOSIS — R06.02 SOB (SHORTNESS OF BREATH): ICD-10-CM

## 2021-12-29 PROCEDURE — 71046 X-RAY EXAM CHEST 2 VIEWS: CPT

## 2022-01-14 ENCOUNTER — HOSPITAL ENCOUNTER (OUTPATIENT)
Dept: MAMMOGRAPHY | Age: 42
Discharge: HOME | End: 2022-01-16
Payer: COMMERCIAL

## 2022-01-14 DIAGNOSIS — Z12.31 OTHER SCREENING MAMMOGRAM: ICD-10-CM

## 2022-01-14 PROCEDURE — 77063 BREAST TOMOSYNTHESIS BI: CPT

## 2022-03-15 ENCOUNTER — TELEPHONE (OUTPATIENT)
Dept: BARIATRICS/WEIGHT MGMT | Age: 42
End: 2022-03-15

## 2022-04-28 ENCOUNTER — OFFICE VISIT (OUTPATIENT)
Dept: FAMILY MEDICINE CLINIC | Age: 42
End: 2022-04-28
Payer: COMMERCIAL

## 2022-04-28 VITALS
WEIGHT: 253 LBS | OXYGEN SATURATION: 100 % | TEMPERATURE: 96.6 F | HEIGHT: 66 IN | BODY MASS INDEX: 40.66 KG/M2 | SYSTOLIC BLOOD PRESSURE: 123 MMHG | RESPIRATION RATE: 18 BRPM | DIASTOLIC BLOOD PRESSURE: 85 MMHG | HEART RATE: 80 BPM

## 2022-04-28 DIAGNOSIS — Z20.2 POSSIBLE EXPOSURE TO STD: ICD-10-CM

## 2022-04-28 DIAGNOSIS — N89.8 VAGINAL ITCHING: ICD-10-CM

## 2022-04-28 DIAGNOSIS — R39.9 UTI SYMPTOMS: ICD-10-CM

## 2022-04-28 DIAGNOSIS — R39.9 UTI SYMPTOMS: Primary | ICD-10-CM

## 2022-04-28 LAB
BILIRUBIN, POC: NEGATIVE
BLOOD URINE, POC: NEGATIVE
CLARITY, POC: CLEAR
COLOR, POC: YELLOW
CONTROL: NORMAL
GLUCOSE URINE, POC: NEGATIVE
KETONES, POC: NEGATIVE
LEUKOCYTE EST, POC: NEGATIVE
NITRITE, POC: NEGATIVE
PH, POC: 7
PREGNANCY TEST URINE, POC: NEGATIVE
PROTEIN, POC: NEGATIVE
SPECIFIC GRAVITY, POC: 1.02
UROBILINOGEN, POC: 1

## 2022-04-28 PROCEDURE — G8427 DOCREV CUR MEDS BY ELIG CLIN: HCPCS | Performed by: NURSE PRACTITIONER

## 2022-04-28 PROCEDURE — 99214 OFFICE O/P EST MOD 30 MIN: CPT | Performed by: NURSE PRACTITIONER

## 2022-04-28 PROCEDURE — 1036F TOBACCO NON-USER: CPT | Performed by: NURSE PRACTITIONER

## 2022-04-28 PROCEDURE — 81025 URINE PREGNANCY TEST: CPT | Performed by: NURSE PRACTITIONER

## 2022-04-28 PROCEDURE — G8417 CALC BMI ABV UP PARAM F/U: HCPCS | Performed by: NURSE PRACTITIONER

## 2022-04-28 PROCEDURE — 81002 URINALYSIS NONAUTO W/O SCOPE: CPT | Performed by: NURSE PRACTITIONER

## 2022-04-28 PROCEDURE — 96372 THER/PROPH/DIAG INJ SC/IM: CPT | Performed by: NURSE PRACTITIONER

## 2022-04-28 RX ORDER — CEFTRIAXONE 500 MG/1
500 INJECTION, POWDER, FOR SOLUTION INTRAMUSCULAR; INTRAVENOUS ONCE
Status: COMPLETED | OUTPATIENT
Start: 2022-04-28 | End: 2022-04-28

## 2022-04-28 RX ORDER — LEVOTHYROXINE SODIUM 137 UG/1
TABLET ORAL
COMMUNITY
Start: 2022-03-10

## 2022-04-28 RX ORDER — AZITHROMYCIN 500 MG/1
1000 TABLET, FILM COATED ORAL ONCE
Qty: 2 TABLET | Refills: 0 | Status: SHIPPED | OUTPATIENT
Start: 2022-04-28 | End: 2022-04-28

## 2022-04-28 RX ORDER — FLUCONAZOLE 150 MG/1
TABLET ORAL
Qty: 2 TABLET | Refills: 0 | Status: SHIPPED | OUTPATIENT
Start: 2022-04-28

## 2022-04-28 RX ORDER — CHLORTHALIDONE 25 MG/1
TABLET ORAL
COMMUNITY
Start: 2022-04-25

## 2022-04-28 RX ADMIN — CEFTRIAXONE 500 MG: 500 INJECTION, POWDER, FOR SOLUTION INTRAMUSCULAR; INTRAVENOUS at 15:23

## 2022-04-28 NOTE — PROGRESS NOTES
22  Elana Toussaint : 1980 Sex: female  Age 42 y.o. Subjective:  Chief Complaint   Patient presents with    Sexually Transmitted Diseases       HPI:   Elana Toussaint , 42 y.o. female presents to the clinic for evaluation of suprapubic pressure and increased urinary frequency x 3 days. The patient also reports vaginal itching and risk for STD exposure ( reported cheating). The patient has not taken any treatment for symptoms. The patient reports unchanged symptoms over time. The patient denies vaginal irritation, abdominal pain, and vaginal discharge. The patient denies abnormal vaginal bleeding, pregnancy, and rash. The patient also denies headache, fever, chest pain, shortness of breath, and nausea / vomiting / diarrhea. The patient reports 1 sexual partners in the past 6 months. The patient denies using protective contraception. Patient's last menstrual period was 2022 (approximate). ROS:   Unless otherwise stated in this report the patient's positive and negative responses for review of systems for constitutional, eyes, ENT, cardiovascular, respiratory, gastrointestinal, neurological, , musculoskeletal, and integument systems and related systems to the presenting problem are either stated in the history of present illness or were not pertinent or were negative for the symptoms and/or complaints related to the presenting medical problem. Positives and pertinent negatives as per HPI. All others reviewed and are negative.       PMH:     Past Medical History:   Diagnosis Date    Environmental allergies 2018    Morbid obesity due to excess calories ([facility] [address]     Thyroid disease        Past Surgical History:   Procedure Laterality Date    COLONOSCOPY      COLONOSCOPY N/A 2020    COLONOSCOPY POSS HET performed by Arvind Mena MD at [place] [address] N/A 2020    HEMORRHOID THERMAL ENERGY performed by Arvind Mena MD at [address] [place] [address] THYROIDECTOMY N/A 7/1/2019    TOTAL THYROIDECTOMY WITH NERVE INTEGRITY MONITOR performed by Sherry November, DO [facility] [address]      WISDOM TOOTH EXTRACTION         History reviewed. No pertinent family history. Medications:     Current Outpatient Medications:     levothyroxine (SYNTHROID) 137 MCG tablet, TAKE 1 TABLET BY MOUTH EVERY DAY, Disp: , Rfl:     chlorthalidone (HYGROTON) 25 MG tablet, , Disp: , Rfl:     fluconazole (DIFLUCAN) 150 MG tablet, 1 tab po x 1 dose, may repeat in 72 hrs if still symptomatic., Disp: 2 tablet, Rfl: 0    azithromycin (ZITHROMAX) 500 MG tablet, Take 2 tablets by mouth once for 1 dose, Disp: 2 tablet, Rfl: 0    albuterol sulfate HFA (VENTOLIN HFA) 108 (90 Base) MCG/ACT inhaler, Inhale 2 puffs into the lungs 4 times daily as needed for Wheezing, Disp: 18 g, Rfl: 0    levothyroxine (SYNTHROID) 112 MCG tablet, Take 112 mcg by mouth Daily, Disp: , Rfl:     docusate sodium (COLACE) 100 MG capsule, TAKE 1 CAPSULE BY MOUTH DAILY AS NEEDED FOR CONSTIPATION, Disp: 30 capsule, Rfl: 0    ibuprofen (ADVIL;MOTRIN) 800 MG tablet, Take 1 tablet by mouth 2 times daily as needed for Pain, Disp: 180 tablet, Rfl: 1    hydrocortisone (ANUSOL-HC) 2.5 % rectal cream, Place rectally 2 times daily. , Disp: 28 g, Rfl: 0    Allergies: Allergies   Allergen Reactions    Seasonal      sneeze       Social History:     Social History     Tobacco Use    Smoking status: Never Smoker    Smokeless tobacco: Never Used   Vaping Use    Vaping Use: Never used   Substance Use Topics    Alcohol use: Yes     Comment: rare    Drug use: No       Patient lives at home. Physical Exam:     Vitals:    04/28/22 1413   BP: 123/85   Pulse: 80   Resp: 18   Temp: 96.6 °F (35.9 °C)   SpO2: 100%   Weight: 253 lb (114.8 kg)   Height: 5' 6\" (1.676 m)       Physical Exam (PE)   Constitutional: Alert, development consistent with age. HENT:      Head: Normocephalic.       Right Ear: External ear normal.      Left Ear: External ear normal.      Nose: Normal.      Mouth/Throat:     Mouth: Mucous membranes are moist.      Pharynx: Oropharynx is clear. Eyes: Pupils: Pupils are equal, round, and reactive to light. Neck: Normal ROM. Supple. Cardiovascular: Heart RRR without pathologic murmurs or gallops. Pulmonary: Respiratory effort normal.  Normal breath sounds. Abdomen:  General Appearance: No rashes, bruising, or abrasions noted. Bowel sounds: BS+x4. Distension:  None. Tenderness: Mild suprapubic. Liver/Spleen:  Non-tender and no hepatosplenomegaly. Back: CVA Tenderness: None bilaterally. Pelvic Exam: Chaperone present during examination. External Genitalia: Appears wnl without rashes or lesions. Vagina: Vaginal mucosa pink and moist with clear discharge noted. Cervix: Cervix appears wnl, no CMT noted. Skin:  Normal turgor. Warm, dry, without visible rash, unless noted elsewhere. Neurological:  Orientation age-appropriate. Motor functions intact. Psychiatric: Mood and Affect: Mood normal. Behavior: Behavior normal    Testing:   (All laboratory and radiology results have been personally reviewed by myself)  Labs:  Results for orders placed or performed in visit on 04/28/22   POCT Urinalysis no Micro   Result Value Ref Range    Color, UA yellow     Clarity, UA clear     Glucose, UA POC negative     Bilirubin, UA negative     Ketones, UA negative     Spec Grav, UA 1.025     Blood, UA POC negative     pH, UA 7.0     Protein, UA POC negative     Urobilinogen, UA 1.0     Leukocytes, UA negative     Nitrite, UA negative    POCT urine pregnancy   Result Value Ref Range    Preg Test, Ur negative     Control pass        Imaging: All Radiology results interpreted by Radiologist unless otherwise noted. No orders to display       Assessment / Plan:   The patient's vitals, allergies, medications, and past medical history have been reviewed.   Jeannie Bryant was seen today for sexually transmitted diseases. Diagnoses and all orders for this visit:    UTI symptoms  -     POCT Urinalysis no Micro  -     POCT urine pregnancy  -     Culture, Urine; Future    Possible exposure to STD  -     C.trachomatis N.gonorrhoeae DNA, Urine; Future  -     Culture, Genital; Future  -     Wet prep, genital; Future  -     HIV Screen; Future  -     azithromycin (ZITHROMAX) 500 MG tablet; Take 2 tablets by mouth once for 1 dose  -     cefTRIAXone (ROCEPHIN) injection 500 mg    Vaginal itching  -     fluconazole (DIFLUCAN) 150 MG tablet; 1 tab po x 1 dose, may repeat in 72 hrs if still symptomatic.        - Disposition: Home    - Educational material printed for patient's review and were included in patient instructions. After Visit Summary was given to patient at the end of visit. - Patient request additional HIV testing today. - UA came back negative in the office and urine HCG was negative. Urine also sent for GC/CT testing today. Labs obtained and will call with results once available. Pt will be required to give her SSN to receive these results via phone. I am going to cover pt for GC/CT today with IM Rocephin and oral Zithromax, potential reactions discussed. - Advised to avoid sexual contact for at least 1 week after treatment to prevent spread. Advise f/u with PCP if still symptomatic after 1 week. ED sooner if symptoms worsen or change. ED immediately with the development of fever, lethargy, abdominal pain, vomiting, or back pain. Pt states understanding and is in agreement with this care plan. All questions answered. SIGNATURE: IVÁN Merritt-CNP     *NOTE: This report was transcribed using voice recognition software. Every effort was made to ensure accuracy; however, inadvertent computerized transcription errors may be present.

## 2022-04-28 NOTE — PATIENT INSTRUCTIONS
Patient Education        Exposure to Sexually Transmitted Infections: Care Instructions  Overview  Sexually transmitted infections (STIs) are diseases spread by sexual contact. This includes vaginal, oral, and anal sex. If you're pregnant, you can alsospread them to your baby before or during the birth. There are at least 20 different STIs. They include chlamydia, gonorrhea, syphilis, and human immunodeficiency virus (HIV). (This is the virus thatcauses AIDS.) Some STIs can reduce the chance of getting pregnant in the future. Treatment can cure STIs caused by bacteria. STIs caused by viruses, such asHIV, can be treated, but they can't be cured. Follow-up care is a key part of your treatment and safety. Be sure to make and go to all appointments, and call your doctor if you are having problems. It's also a good idea to know your test results and keep alist of the medicines you take. How can you care for yourself at home?  Take medicines exactly as prescribed.  If your doctor prescribed antibiotics, take them as directed. Don't stop taking them just because you feel better. You need to take the full course of antibiotics.  Tell your sex partner(s) that they will need treatment.  Don't douche. Douching changes the normal balance of bacteria in your vagina. It may increase the risk of spreading the infection to your uterus or other reproductive organs. How can you prevent sexually transmitted infections (STIs)? You can help prevent STIs if you wait to have sex with a new partner (or partners) until you've each been tested for STIs. It also helps if you use condoms (male or female condoms) and if you limit your sex partners to Brunilda who only has sex with you. When should you call for help? Call your doctor now or seek immediate medical care if:     You have new pain in your belly or pelvis.      You have symptoms of a urinary tract infection.  These may include:  ? Pain or burning when you urinate. ? A frequent need to urinate without being able to pass much urine. ? Pain in the flank, which is just below the rib cage and above the waist on either side of the back. ? Blood in your urine. ? A fever.      You have new or worsening pain or swelling in the scrotum. Watch closely for changes in your health, and be sure to contact your doctor if:     You have unusual vaginal bleeding.      You have a discharge from the vagina or penis.      You have any new symptoms, such as sores, bumps, rashes, blisters, or warts.      You have itching, tingling, pain, or burning in the genital or anal area.      You think you may have an STI. Where can you learn more? Go to https://[employer].Webcrumbz. org and sign in to your M.Setek account. Enter F570 in the [employer] box to learn more about \"Exposure to Sexually Transmitted Infections: Care Instructions. \"     If you do not have an account, please click on the \"Sign Up Now\" link. Current as of: November 17, 2021               Content Version: 13.2  © 4875-4545 [employer]. Care instructions adapted under license by [facility] ([facility]). If you have questions about a medical condition or this instruction, always ask your healthcare professional. Rande Peabody any warranty or liability for your use of this information. Patient Education        Urinary Tract Infection (UTI) in Women: Care Instructions  Overview     A urinary tract infection, or UTI, is a general term for an infection anywhere between the kidneys and the urethra (where urine comes out). Most UTIs arebladder infections. They often cause pain or burning when you urinate. UTIs are caused by bacteria and can be cured with antibiotics. Be sure tocomplete your treatment so that the infection does not get worse. Follow-up care is a key part of your treatment and safety.  Be sure to make and go to all appointments, and call your doctor if you are having problems. It's also a good idea to know your test results and keep alist of the medicines you take. How can you care for yourself at home?  Take your antibiotics as directed. Do not stop taking them just because you feel better. You need to take the full course of antibiotics.  Drink extra water and other fluids for the next day or two. This will help make the urine less concentrated and help wash out the bacteria that are causing the infection. (If you have kidney, heart, or liver disease and have to limit fluids, talk with your doctor before you increase the amount of fluids you drink.)   Avoid drinks that are carbonated or have caffeine. They can irritate the bladder.  Urinate often. Try to empty your bladder each time.  To relieve pain, take a hot bath or lay a heating pad set on low over your lower belly or genital area. Never go to sleep with a heating pad in place. To prevent UTIs   Drink plenty of water each day. This helps you urinate often, which clears bacteria from your system. (If you have kidney, heart, or liver disease and have to limit fluids, talk with your doctor before you increase the amount of fluids you drink.)   Urinate when you need to.  If you are sexually active, urinate right after you have sex.  Change sanitary pads often.  Avoid douches, bubble baths, feminine hygiene sprays, and other feminine hygiene products that have deodorants.  After going to the bathroom, wipe from front to back. When should you call for help? Call your doctor now or seek immediate medical care if:     Symptoms such as fever, chills, nausea, or vomiting get worse or appear for the first time.      You have new pain in your back just below your rib cage. This is called flank pain.      There is new blood or pus in your urine.      You have any problems with your antibiotic medicine.    Watch closely for changes in your health, and be sure to contact your doctor if:     You are not getting better after taking an antibiotic for 2 days.      Your symptoms go away but then come back. Where can you learn more? Go to https://chpepiceweb.healthCrowdBouncer. org and sign in to your [employer] account. Enter H095 in the [facility] box to learn more about \"Urinary Tract Infection (UTI) in Women: Care Instructions. \"     If you do not have an account, please click on the \"Sign Up Now\" link. Current as of: October 18, 2021               Content Version: 13.2  © 2006-2022 [employer]. Care instructions adapted under license by [facility] ([facility]). If you have questions about a medical condition or this instruction, always ask your healthcare professional. [address] any warranty or liability for your use of this information. Patient Education        Vaginal Yeast Infection: Care Instructions  Overview     A vaginal yeast infection is the growth of too many yeast cells in the vagina. This is common in women of all ages. Itching, vaginal discharge and irritation, and other symptoms can bother you. But yeast infections don't often cause otherhealth problems. Some medicines can increase your risk of getting a yeast infection. These include antibiotics, birth control pills, hormones, and steroids. You may also be more likely to get a yeast infection if you are pregnant, have diabetes,douche, or wear tight clothes. With treatment, most yeast infections get better in 2 to 3 days. Follow-up care is a key part of your treatment and safety. Be sure to make and go to all appointments, and call your doctor if you are having problems. It's also a good idea to know your test results and keep alist of the medicines you take. How can you care for yourself at home?  Take your medicines exactly as prescribed. Call your doctor if you think you are having a problem with your medicine.    Ask your doctor about over-the-counter (OTC) medicines for yeast infections. They may cost less than prescription medicines. If you use an OTC treatment, read and follow all instructions on the label.  Don't use tampons while using a vaginal cream or suppository. The tampons can absorb the medicine. Use pads instead.  Wear loose cotton clothing. Don't wear nylon or other fabric that holds body heat and moisture close to the skin.  Try sleeping without underwear.  Don't scratch. Relieve itching with a cold pack or a cool bath.  Don't wash your vaginal area more than once a day. Use plain water or a mild, unscented soap. Air-dry the vaginal area.  Change out of wet swimsuits after swimming.  If you are using a vaginal medicine, don't have sex until you have finished your treatment. But if you do have sex, don't depend on a condom or diaphragm for birth control. The oil in some vaginal medicines weakens latex.  Don't douche. When should you call for help? Call your doctor now or seek immediate medical care if:     You have unexpected vaginal bleeding.      You have new or increased pain in your vagina or pelvis. Watch closely for changes in your health, and be sure to contact your doctor if:     You have a fever.      You are not getting better after 2 days.      Your symptoms come back after you finish your medicines. Where can you learn more? Go to https://Iah[employer] Solutions[employer]American [employer]).healthHarbor BioSciences. org and sign in to your [employer] account. Enter C203 in the [facility] box to learn more about \"Vaginal Yeast Infection: Care Instructions. \"     If you do not have an account, please click on the \"Sign Up Now\" link. Current as of: November 22, 2021               Content Version: 13.2  © 5411-8204 [employer]. Care instructions adapted under license by [facility] ([facility]).  If you have questions about a medical condition or this instruction, always ask your healthcare professional. [address]  any warranty or liability for your use of this information.

## 2022-04-29 LAB — HIV-1 AND HIV-2 ANTIBODIES: NORMAL

## 2022-05-01 LAB
GENITAL CULTURE, ROUTINE: NORMAL
URINE CULTURE, ROUTINE: NORMAL

## 2022-05-02 ENCOUNTER — TELEPHONE (OUTPATIENT)
Dept: FAMILY MEDICINE CLINIC | Age: 42
End: 2022-05-02

## 2022-05-02 NOTE — TELEPHONE ENCOUNTER
Анна from [facility]'s lab called stating they did not get the urine for the N. Gonorrheae, C. trachomaits DNA testing. Please advise. pt was seen on 4.28.22 at the walk in clinic.

## 2022-05-04 LAB — CULTURE, TRICHOMONAS: NORMAL

## 2022-05-06 ENCOUNTER — TELEPHONE (OUTPATIENT)
Dept: BARIATRICS/WEIGHT MGMT | Age: 42
End: 2022-05-06

## 2022-05-06 ENCOUNTER — INITIAL CONSULT (OUTPATIENT)
Dept: BARIATRICS/WEIGHT MGMT | Age: 42
End: 2022-05-06
Payer: COMMERCIAL

## 2022-05-06 VITALS
HEIGHT: 66 IN | RESPIRATION RATE: 20 BRPM | HEART RATE: 81 BPM | BODY MASS INDEX: 42.91 KG/M2 | TEMPERATURE: 97.5 F | DIASTOLIC BLOOD PRESSURE: 79 MMHG | SYSTOLIC BLOOD PRESSURE: 133 MMHG | WEIGHT: 267 LBS

## 2022-05-06 DIAGNOSIS — E66.01 MORBID OBESITY DUE TO EXCESS CALORIES (HCC): ICD-10-CM

## 2022-05-06 DIAGNOSIS — K21.9 GASTROESOPHAGEAL REFLUX DISEASE WITHOUT ESOPHAGITIS: ICD-10-CM

## 2022-05-06 DIAGNOSIS — K30 INDIGESTION: Primary | ICD-10-CM

## 2022-05-06 PROCEDURE — G8417 CALC BMI ABV UP PARAM F/U: HCPCS | Performed by: SURGERY

## 2022-05-06 PROCEDURE — 99202 OFFICE O/P NEW SF 15 MIN: CPT

## 2022-05-06 PROCEDURE — G8427 DOCREV CUR MEDS BY ELIG CLIN: HCPCS | Performed by: SURGERY

## 2022-05-06 PROCEDURE — 1036F TOBACCO NON-USER: CPT | Performed by: SURGERY

## 2022-05-06 PROCEDURE — 99214 OFFICE O/P EST MOD 30 MIN: CPT | Performed by: SURGERY

## 2022-05-06 RX ORDER — SODIUM CHLORIDE 0.9 % (FLUSH) 0.9 %
5-40 SYRINGE (ML) INJECTION EVERY 12 HOURS SCHEDULED
Status: CANCELLED | OUTPATIENT
Start: 2022-05-06

## 2022-05-06 RX ORDER — SODIUM CHLORIDE 9 MG/ML
INJECTION, SOLUTION INTRAVENOUS PRN
Status: CANCELLED | OUTPATIENT
Start: 2022-05-06

## 2022-05-06 RX ORDER — SODIUM CHLORIDE 9 MG/ML
INJECTION, SOLUTION INTRAVENOUS CONTINUOUS
Status: CANCELLED | OUTPATIENT
Start: 2022-05-06

## 2022-05-06 RX ORDER — SODIUM CHLORIDE 0.9 % (FLUSH) 0.9 %
5-40 SYRINGE (ML) INJECTION PRN
Status: CANCELLED | OUTPATIENT
Start: 2022-05-06

## 2022-05-06 NOTE — PATIENT INSTRUCTIONS
What is the next step to proceed with weight loss surgery? Please be aware that any co-pays or deductibles may be requested prior to testing and / or procedures. You will need to schedule a psychological evaluation for weight loss surgery. Patients will be required to complete all psychological testing as required by the mental health provider. Patients must also follow all of the provider's recommendations before weight loss surgery can be scheduled. The evaluation must be done a standard way for weight loss surgery. We strongly recommend that you contact one of our preferred providers listed below to arrange this:      Serg Jennings, [facility]  [address] [place], [place]   [phone]    [facility] and [address]   [phone]    Dr. Adina Zavala, PhD    [facility] [address] [place], [place]    [phone]      You will also need to plan on attending a 2 hour nutrition class at [facility] prior to your surgery. We will schedule this for you when we schedule your surgery. Please remember to have your labs drawn 10 days prior to your first scheduled dietary appointment. Please remember, that while we will submit your case to insurance for surgery authorization, it is your responsibility to know if your plan covers weight loss surgery and keep up-to-date with changes to your insurance coverage. We will do everything possible to help you get approved for weight loss surgery, but cannot guarantee an approval.     Please note that you will not be submitted to your insurance company until all pre-operative testing requirements are met.

## 2022-05-06 NOTE — PROGRESS NOTES
Isabel Hollingsworth  5/6/2022  [facility]    Initial Evaluation  Bariatric Surgery and EGD       Subjective:  CHIEF COMPLAINT: Morbid obesity, malnutrition, Hyperlipidemia, Hypothyroid, Obstructive Sleep Apnea, GERD and hemorrhoids    HISTORY OF PRESENT ILLNESS: Isabel Hollingsworth is a morbidly-obese 42 y.o.  female, who weighs 267 lb (121.1 kg). She is 105 pounds over her ideal body weight. The severity is severe with Body mass index is 43.09 kg/m². She has multiple medical problems aggravated by her obesity. They have been overweight since age 14. She wishes to have bariatric surgery so that she can lose a large amount of weight to a goal of their ideal body weight based on height, build and sex, and keep the weight off. I have met with her in the [facility] where we discussed the surgery in great detail and went over the risks and benefits. She has watched our informational video so she understands all of the extensive risks involved. She states that she understands all of the risks and wishes to proceed with the evaluation. We have discussed the different surgical options in detail with use of diagrams and drawings to detail the procedures, risks and alternatives. We discussed the postoperative diet and proposed life long diet for weight management after surgery.      They are a nonsmoker  They have no significant exposure to second hand smoke    Past Medical History  Patient Active Problem List   Diagnosis    Environmental allergies    Impaired fasting glucose    Hyperlipidemia    Post-op pain    Thyroid nodule    Status post thyroidectomy     Past Surgical History  Past Surgical History:   Procedure Laterality Date    COLONOSCOPY      COLONOSCOPY N/A 6/8/2020    COLONOSCOPY POSS HET performed by Marizol Hale MD at [facility] N/A 6/8/2020    HEMORRHOID THERMAL ENERGY performed by Marizol Hale MD at [place] [address]  THYROIDECTOMY N/A 7/1/2019    TOTAL THYROIDECTOMY WITH NERVE INTEGRITY MONITOR performed by Ailyn Miles DO at [facility]      WISDOM TOOTH EXTRACTION        Allergies: Seasonal     Home Medications  Current Outpatient Medications   Medication Sig Dispense Refill    levothyroxine (SYNTHROID) 137 MCG tablet TAKE 1 TABLET BY MOUTH EVERY DAY      chlorthalidone (HYGROTON) 25 MG tablet       fluconazole (DIFLUCAN) 150 MG tablet 1 tab po x 1 dose, may repeat in 72 hrs if still symptomatic. 2 tablet 0    albuterol sulfate HFA (VENTOLIN HFA) 108 (90 Base) MCG/ACT inhaler Inhale 2 puffs into the lungs 4 times daily as needed for Wheezing 18 g 0    levothyroxine (SYNTHROID) 112 MCG tablet Take 112 mcg by mouth Daily      docusate sodium (COLACE) 100 MG capsule TAKE 1 CAPSULE BY MOUTH DAILY AS NEEDED FOR CONSTIPATION 30 capsule 0    ibuprofen (ADVIL;MOTRIN) 800 MG tablet Take 1 tablet by mouth 2 times daily as needed for Pain 180 tablet 1    hydrocortisone (ANUSOL-HC) 2.5 % rectal cream Place rectally 2 times daily. 28 g 0     No current facility-administered medications for this visit. Social History:   TOBACCO:   reports that she has never smoked. She has never used smokeless tobacco.  All smokers must join the free smoking cessation program and stop smoking for 3 months before having any Bariatric surgery. ETOH:    reports current alcohol use. The patient has a family history that is negative for severe cardiovascular or respiratory issues, negative for reaction to anesthesia. Review of Systems    A complete 10 system review was performed and are otherwise negative unless mentioned in the above HPI. Specific negatives are listed below but may not include all those reviewed.     General ROS: negative obtundation, AMS  ENT ROS: negative rhinorrhea, epistaxis  Allergy and Immunology ROS: negative itchy/watery eyes or nasal congestion  Hematological and Lymphatic ROS: negative spontaneous bleeding or bruising  Endocrine ROS: negative  lethargy, mood swings, palpitations or polydipsia/polyuria  Respiratory ROS: negative sputum changes, stridor, tachypnea or wheezing  Cardiovascular ROS: negative for - loss of consciousness, murmur or orthopnea  Gastrointestinal ROS: negative for - hematochezia or hematemesis  Genito-Urinary ROS: negative for -  genital discharge or hematuria  Musculoskeletal ROS: negative for - focal weakness, gangrene  Psych/Neuro ROS: negative for - visual or auditory hallucinations, suicidal ideation      Physical Exam:   VITALS: /79 (Site: Left Lower Arm, Position: Sitting, Cuff Size: Large Adult)   Pulse 81   Temp 97.5 °F (36.4 °C) (Temporal)   Resp 20   Ht 5' 6\" (1.676 m)   Wt 267 lb (121.1 kg)   BMI 43.09 kg/m²   General appearance: AAO, NAD  Eyes: PERRL, EOMI, red conjunctiva  Lungs: equal chest rise bilateral, no wheeze, no rhonchi  Chest wall: atraumatic, no tenderness, no echymosis or abrasions  Heart: reg rate, no murmur  Abdomen: soft, nondistended, nontender, obese  Extremities: full ROM all 4 ext, no gross motor or sensory deficits  Pulses: 2+ distal  Skin: warm and dry  Neurologic: spontanous eye opening, purposeful, follows complex commands      Assessment:  Morbid obesity with inability to keep the weight off with diet and exercise. She is interested in Laparoscopic Erica-en- Y Gastric Bypass or Sleeve Gastrectomy. We discussed that our goal is to ameliorate the medical problems and not to obtain a specific body mass index. She understands the risks and benefits, and wishes to proceed with the evaluation. Plan:  Psych evaluation, medical clearance, gallbladder ultrasound. These patients often have vitamin deficiencies so I will do screening labs for malnutrition. I will do an upper endoscopy to check for hiatal hernias, ulcers and H. Pylori while we wait for insurance approval for the surgery.     I have spent over 45min in evaluation of the patient including greater than 50% of that time face to face discussing surgical options, medical and surgical history, plans for medical weight loss management and preoperative clearance for surgery. They did not have family present for the discussion and visual aides and drawings were used to explain anatomy and surgical procedures.      Physician Signature: Electronically signed by Dr. Sandra Powers MD    Send copy of H&P to PCP, IVÁN Mendez NP

## 2022-05-06 NOTE — TELEPHONE ENCOUNTER
Prior Authorization Form  DEMOGRAPHICS:    Patient Name:  Isreal Hanson  Patient :  1980            Insurance:  Payor: Sharron Jonas / Plan: Marlene Yeboah / Product Type: *No Product type* /   Insurance ID Number:    Payor/Plan Subscr  Sex Relation Sub.  Ins. ID Effective Group Num   1. LISA Scherer [facility] 1980 Female Self [id number] [address] [facility] [address]         DIAGNOSIS & PROCEDURE:    Procedure/Operation: egd           CPT Code: 97857    Diagnosis:  gerd    ICD10 Code: k21.9    Location:  [facility]    Surgeon:  Elvie Nunez INFORMATION:    Date: 22    Time:               Anesthesia:  MAC/TIVA                                                       Status:  Outpatient        Special Comments:         Electronically signed by Lisha Marquez MA on 2022 at 1:27 PM

## 2022-05-07 ENCOUNTER — HOSPITAL ENCOUNTER (OUTPATIENT)
Age: 42
Discharge: HOME | End: 2022-05-07
Payer: COMMERCIAL

## 2022-05-07 DIAGNOSIS — E66.01 MORBID OBESITY DUE TO EXCESS CALORIES (HCC): ICD-10-CM

## 2022-05-07 LAB
ALBUMIN SERPL-MCNC: 4.2 G/DL (ref 3.5–5.2)
ALP BLD-CCNC: 69 U/L (ref 35–104)
ALT SERPL-CCNC: 11 U/L (ref 0–32)
ANION GAP SERPL CALCULATED.3IONS-SCNC: 10 MMOL/L (ref 7–16)
AST SERPL-CCNC: 19 U/L (ref 0–31)
BILIRUB SERPL-MCNC: 0.6 MG/DL (ref 0–1.2)
BUN BLDV-MCNC: 12 MG/DL (ref 6–20)
CALCIUM SERPL-MCNC: 9.1 MG/DL (ref 8.6–10.2)
CHLORIDE BLD-SCNC: 102 MMOL/L (ref 98–107)
CHOLESTEROL, TOTAL: 181 MG/DL (ref 0–199)
CO2: 26 MMOL/L (ref 22–29)
CREAT SERPL-MCNC: 0.9 MG/DL (ref 0.5–1)
FERRITIN: 115 NG/ML
FOLATE: 16.7 NG/ML (ref 4.8–24.2)
GFR AFRICAN AMERICAN: >60
GFR NON-AFRICAN AMERICAN: >60 ML/MIN/1.73
GLUCOSE BLD-MCNC: 96 MG/DL (ref 74–99)
HBA1C MFR BLD: 5.5 % (ref 4–5.6)
HCT VFR BLD CALC: 42.8 % (ref 34–48)
HEMOGLOBIN: 13.2 G/DL (ref 11.5–15.5)
MCH RBC QN AUTO: 24.5 PG (ref 26–35)
MCHC RBC AUTO-ENTMCNC: 30.8 % (ref 32–34.5)
MCV RBC AUTO: 79.4 FL (ref 80–99.9)
PDW BLD-RTO: 15.5 FL (ref 11.5–15)
PLATELET # BLD: 305 E9/L (ref 130–450)
PMV BLD AUTO: 9.2 FL (ref 7–12)
POTASSIUM SERPL-SCNC: 4.1 MMOL/L (ref 3.5–5)
PREALBUMIN: 24 MG/DL (ref 20–40)
RBC # BLD: 5.39 E12/L (ref 3.5–5.5)
SODIUM BLD-SCNC: 138 MMOL/L (ref 132–146)
TOTAL PROTEIN: 7.8 G/DL (ref 6.4–8.3)
TRIGL SERPL-MCNC: 60 MG/DL (ref 0–149)
VITAMIN B-12: 613 PG/ML (ref 211–946)
VITAMIN D 25-HYDROXY: 17 NG/ML (ref 30–100)
WBC # BLD: 6.4 E9/L (ref 4.5–11.5)

## 2022-05-07 PROCEDURE — 82607 VITAMIN B-12: CPT

## 2022-05-07 PROCEDURE — 82728 ASSAY OF FERRITIN: CPT

## 2022-05-07 PROCEDURE — 36415 COLL VENOUS BLD VENIPUNCTURE: CPT

## 2022-05-07 PROCEDURE — 84134 ASSAY OF PREALBUMIN: CPT

## 2022-05-07 PROCEDURE — 82465 ASSAY BLD/SERUM CHOLESTEROL: CPT

## 2022-05-07 PROCEDURE — 82746 ASSAY OF FOLIC ACID SERUM: CPT

## 2022-05-07 PROCEDURE — 84425 ASSAY OF VITAMIN B-1: CPT

## 2022-05-07 PROCEDURE — 83036 HEMOGLOBIN GLYCOSYLATED A1C: CPT

## 2022-05-07 PROCEDURE — 80053 COMPREHEN METABOLIC PANEL: CPT

## 2022-05-07 PROCEDURE — 82306 VITAMIN D 25 HYDROXY: CPT

## 2022-05-07 PROCEDURE — 84630 ASSAY OF ZINC: CPT

## 2022-05-07 PROCEDURE — 84478 ASSAY OF TRIGLYCERIDES: CPT

## 2022-05-07 PROCEDURE — 85027 COMPLETE CBC AUTOMATED: CPT

## 2022-05-10 ENCOUNTER — INITIAL CONSULT (OUTPATIENT)
Dept: BARIATRICS/WEIGHT MGMT | Age: 42
End: 2022-05-10

## 2022-05-10 VITALS — BODY MASS INDEX: 43.39 KG/M2 | WEIGHT: 270 LBS | HEIGHT: 66 IN

## 2022-05-10 DIAGNOSIS — E66.01 MORBID OBESITY DUE TO EXCESS CALORIES (HCC): ICD-10-CM

## 2022-05-10 DIAGNOSIS — Z71.3 DIETARY COUNSELING: Primary | ICD-10-CM

## 2022-05-10 PROCEDURE — 99999 PR OFFICE/OUTPT VISIT,PROCEDURE ONLY: CPT

## 2022-05-10 NOTE — PROGRESS NOTES
[facility]:  Initial Nutrition Consultation    Today's Date: 5/10/2022  Patients Name:Maeve Duke     Height: 5' 6\" (1.676 m)   Weight: 270 lb (122.5 kg)   IBW: 159 lbs  %IBW: 170%  Excess Weight: 111 lbs  BMI: Body mass index is 43.58 kg/m². Subjective Information:   Patient has tried multiple means of weight loss including diet and exercise in the past and has been unable to maintain a healthy weight. Pt states he / she has tried the following Ketogenic diet, Vegan diet, Pescatarian. Patients overall goal is to be able to lose weight with diet and exercise by changing lifestyle, habits and maintain a healthy weight for a lifetime. Are your currently Diabetic no  Last Blood Glucose Reading 96 WNL on 5/7/22  Last HGBA1C 5.5 WNL on 5/7/22    Patient states the following will be the most difficult change after weight loss surgery? No significant problems identified    Most successful diet in the past? Ketogenic   Length of time patient was on the diet listed above? 1 month  Weight lost on the diet listed above? 20 lbs   Patient stated he / she maintained his / her weight for the following time? Six months     Patient takes the following vitamins, minerals and dietary supplements? Centrum MVI     Patient consumes the following beverage daily? Water, some juices daily    5/7/22 Pre-Op Labs  Hgb 13.2 WNL, Hct 42.8 WNL, Ferritin 115 WNL, vit D 17L  Note:  Pt had vit D deficiency and pt was instructed to see her PCP and to treat the vit D def per her PCP. Not recheck of vit D required prior to surgery   Note:  Zinc and Vit B1 still pending.      Patient states he / she has the following problems:  no - Eating  no - Chewing  no - Swallowing  no - Nausea  no - Vomiting  no - Diarrhea  no - Constipation  no - Hair Changes  no - Skin Changes    Food Allergies: no    Food Intolerances: yes - lactose intolerance - avoids milk    Yes - Past medically assisted weight loss history reviewed. Yes - Provided education regarding the basic role of nutrition in junction with Bariatric Surgery. Yes - Provided overview of required dietary and behavioral changes pre and post operatively. Yes - Stated / reinforced that changes in dietary behaviors are critical to successful, long term weight Loss. Patient is aware that in order to proceed with bariatric surgery he / she will need to follow a low-fat diet prior to surgery. Patient is aware that bariatric surgery is a lifetime change that will require the patient to follow a low-fat, low-calorie, low-sugar, portion controlled diet with at least 30 minutes of physical activity daily. Patient is aware that certain foods may not be tolerated after bariatric surgery and certain foods will need avoided all together. [address] / LD feels that this patient knowledge / readiness to make appropriate diet / lifestyle changes assessed to be? - Good    RD / LD feels this patients expected adherence for post surgical diet? - Good    Patient was instructed and signed consents on a low-fat diet and required supplementation at initial consult. Comments: Patient is able to verbalize diet concepts for bariatric surgery. Patient is aware diet concepts will be reinforced at 2-hour nutrition education consult. RD / LD will monitor progress.

## 2022-05-10 NOTE — PATIENT INSTRUCTIONS
[facility] and [facility]  Dietary Initial Appointment Patient Instructions    By: Shaquille Hartley RD / TARA  [phone]      At your initial dietary appointment you have received the following information packets:    Please be aware at each visit you have been instructed that in order for your insurance company to approve your surgery you must show a consistent weight loss of 2 lbs or greater at each visit. We can not guarantee an approval by your insurance company we can only provide the information given to us it is up to you the patient to show compliancy to your insurance company. If you do gain weight during your supervised weight loss counseling sessions insurance companies starting in 2018 are denying patients for not showing consistent weight loss results when part of a supervised weight loss counseling program. Pt was instructed on 5/10/22. Pt verbalized understanding. · Low-Fat Diet  - Please be sure to begin your low-fat diet from today's date until your scheduled surgery date. If you are not at goal weight by your history and physical appointment with your surgeon your surgery will be cancelled. · Goal Weight: 257 lbs (BMI of 41.5)   · Low-Fat Diet Contract - Patient Acknowledge and Signed  · Supplement List - Please be sure to be saving to purchase your 3 month supply of supplements. If you do not bring supplements to your history and physical appointment your surgery will be cancelled. · Supplement Contract - Patient Acknowledge and Signed  · Please be sure to take a daily Multi-vitamin from now until surgery to reduce your incidence of infection. · If your insurance company requires additional dietary counseling you will be scheduled to see the dietitian the following month. ·  If your psychological evaluation is completed and all your dietary requirements for your individual insurance company have been completed you will be submitted to insurance.  Please make sure to check with us to see if we have received your psychological evaluation. Please be aware that any co-pays or deductibles may be requested prior to testing and / or procedures. You will need to schedule a psychological evaluation for weight loss surgery. Patients will be required to complete all psychological testing as required by the psychologist. Patients must follow all of the psychologist's recommendations before weight loss surgery can be scheduled. The evaluation must be done a standard way for weight loss surgery. We strongly recommend that you contact one of our preferred providers listed below to arrange this:    Perfecto Reece, [address] [facility]                                                              [address] [place] [address]                                                                                      [phone]     Maya Talley [address] [place] [address]                                                                                                [phone]        Dr. Mine Avila, PhD                         [facility] [address]. [place], [place]                                                                                              [phone]     You will also need to plan on attending a 2 hour nutrition class at the [facility] prior to your surgery. We will schedule this for you when we schedule your surgery. Please remember to have your labs drawn prior to your scheduled appointment to review the results of your testing. Please remember, that while we will submit your case to insurance for surgery authorization, it is your responsibility to know if your plan covers weight loss surgery and keep up-to-date with changes to your insurance coverage.   We will do everything possible to help you get approved for weight loss surgery, but cannot guarantee an approval. Please note that you will not be submitted to your insurance company until all   pre-operative testing requirements are met. · Please remember you need to schedule to attend one Support Group meeting held at the [facility] before surgery. This one meeting is mandatory. You can attend as many support group meetings before and after surgery. Support group meetings are held at the [facility] from 6:00 - 8:00pm 1st, and 3rd Tuesday of every month. See dates listed below. · Each individual person has his / her own medical requirements that need fulfilled before being able to schedule bariatric surgery . Please finalize these requirements by contacting our Bariatric Nurse at [phone].    5/7/22 Pre-Op Labs  Hgb 13.2 WNL, Hct 42.8 WNL, Ferritin 115 WNL, vit D 17L  Note:  Pt had vit D deficiency and pt was instructed to see her PCP and to treat the vit D def per her PCP. Not recheck of vit D required prior to surgery   Note:  Zinc and Vit B1 still pending. High Vitamin D Foods:  Written By: Eden Souza RD/LD    What role does Vitamin D play in the body? Vitamin D is known as the sunshine vitamin. Vitamin D is actually a hormone that is produced in our bodies by ultraviolet B rays. These light rays trigger the production of vitamin D by our skin cells. The hormone that is produced is called calcitriol and once it is made it travels to the intestines to help with the absorption of dietary calcium, as well as fluoride. How does Vitamin D work? Vitamin D and Calcium work together to promote the growth of bones and development of healthy teeth. The amount of vitamin D you produce effects the amount of calcium that can be absorbed by the body. When calcium from foods you eat reaches the intestines, it waits for the presence of the vitamin D hormone, calcitriol, to be able to cross the intestinal membranes and to be transported to your bones.   Without sufficient vitamin D, calcium levels in the bones and teeth will decrease leading to weak, brittle bones and increasing the possibility of osteoporosis. WHY? Calcium is primarily found in the blood and needs to remain balanced at a constant level. The calcium that is in the blood is there to be used for quick transport to muscles and nerves when required. When calcium is sent to required organs, the calcium levels in the blood become low. Calcium is then pulled out of the bones and goes into the blood to replace what was sent to the muscles or nerves. This is the normal cycle and is perfect if you have a continuous supply of calcium from the diet to replace calcium from bones and teeth to keep blood levels steady. If dietary calcium levels are depleted, the blood will keep pulling calcium away from the bones and teeth and the structures will be weak. Where can Vitamin D be found? Vitamin D can be self-synthesized with sunlight. Vitamin D is also found in fortified milk, fortified margarine, egg yolks, liver and fatty fish. What occurs when you are deficient in Vitamin D? Bones and Teeth:  Talita Armstrong can occur with Vitamin D deficiency, which is known as softening of the bones and teeth. Due to the softening effect we can see deformities of the limbs, spine, thorax and pelvis. Also seen is pain in the pelvis, lower back and legs, including increase risk to bone fractures. Blood:  Vitamin D deficiency can cause decreased calcium and/or phosphorus in the blood and increased alkaline phosphatase. Nervous/Muscular Systems:  Vitamin D deficiency can cause lax muscles resulting in protrusion of the abdomen (Pot Belly) and muscle spasms. Some patients complain of involuntary twitching and  muscle spasms. Excretory System:  Vitamin D deficiency can cause increased calcium in the stool and decreased calcium in the urine.     Other:  Vitamin D deficiency can cause abnormally high secretions of parathormone, which is why we run lab work at 1 year to make sure the calcium supplements you are taking are being absorbed correctly. Vitamin D in Commonly Eaten Foods Ranked Richest to [employer]:      Food Serving Size IU of   Vitamin D----------        Herring, fresh, raw,  1 oz. 255 IU   [place] 1 oz. 142 IU   Milk,Cows Fortified 1 Cup 100 IU   Sardines, canned 1 oz. 85 IU   Chicken-Liver, Cooked 3oz. 45 IU   Shrimp, Canned 1oz. 30 IU   Egg Yolk 1 25 IU   Calf-Liver, Cooked 3oz. 12 IU   Cream, Light 1 T 8 IU   Cheddar Cheese 1 oz. 3 IU   Oysters 4 3 IU   Butter  1 tsp 1.4 IU            Unfortunately after RYGB surgery you will not be able to increase your Vitamin D with diet alone. A Vitamin D supplement will be needed in order to improve Vitamin D lab values.

## 2022-05-12 LAB — ZINC: 67.7 UG/DL (ref 60–120)

## 2022-05-14 LAB — VITAMIN B1 WHOLE BLOOD: 96 NMOL/L (ref 70–180)

## 2022-06-06 ENCOUNTER — TELEPHONE (OUTPATIENT)
Dept: BARIATRICS/WEIGHT MGMT | Age: 42
End: 2022-06-06

## 2022-06-06 NOTE — TELEPHONE ENCOUNTER
Pt was scheduled for her 2nd wt loss appt via telephone visit today. RD called several times to reach pt,however, pt was unavailable. LM on  to please call [phone] to complete the appt or reschedule.

## 2022-06-09 ENCOUNTER — SCHEDULED TELEPHONE ENCOUNTER (OUTPATIENT)
Dept: BARIATRICS/WEIGHT MGMT | Age: 42
End: 2022-06-09

## 2022-06-09 DIAGNOSIS — E66.01 MORBID OBESITY DUE TO EXCESS CALORIES (HCC): ICD-10-CM

## 2022-06-09 DIAGNOSIS — Z71.3 DIETARY COUNSELING: Primary | ICD-10-CM

## 2022-06-09 PROCEDURE — 99999 PR OFFICE/OUTPT VISIT,PROCEDURE ONLY: CPT

## 2022-06-09 NOTE — PROGRESS NOTES
WEIGHT:  270 lbs     Pt was in th office for his/her 2nd weight Loss appointment. Pt is aware he/she must meet his/her pre-op weight loss goal in order to proceed with weight loss surgery. Patricio / Darius faxed a copy of what was reviewed with the pt to her PCP. Pt verbalized understanding. Patricio// Darius enc the following at today's visit for successful post-surgical Weight Maintenance    1. Weigh yourself daily and record it. 2. Keep documented food records daily   3. Just be more active in day to day routine   4. Higher protein intake and a higher fiber intake. Not a high protein or a high fiber diet just a higher intake. 5.Eliminate empty calorie consumption    Patricio Mccoy addressed the following with the pt:  - Rd  Ld enc pt to comply with nutrition recommendations  - Patricio / Ld enc Participation in support group meetings  - Rd Ld enc pt to go back to maintenance of food records and weight monitoring records   - Patricio Mccoy reviewed the importance of adequate sleep and stress management  - Patricio Mccoy reviewed nonfood strategies to cope with emotions and stress  - Patricio Mccoy encouraged pt to practice the following: Mindful eating: Eating slowly: Focusing   on the eating experience without distraction  - Patricio Mccoy enc. pt to pay attention to hunger and fullness  - Patricio / Darius enc meal planning  - Patricio Ochoa pt to chose nutrient dense whole foods instead of soft, high calorie foods  - Patricio / Darius enc not dr Madelyn Hutchinson large amounts of fluids with or immediately after meals    Portion control ,meal planning and avoiding empty calorie consumption. Patricio / Darius reviewed insurance company weight loss requirement on 6/9/22. Pt verbalized understanding. Please be aware at each visit you have been instructed that in order for your insurance company to approve your surgery you must show a consistent weight loss of 2 lbs or greater at each visit.  We can not guarantee an approval by your insurance company we can only provide the information given to us it is up to you the patient to show compliancy to your insurance company. If you do gain weight during your supervised weight loss counseling sessions insurance companies starting in 2018 are denying patients for not showing consistent weight loss results when part of a supervised weight loss counseling program.       The following education was reviewed:  [facility] and [facility]  Supplement Guidelines for   Bariatric Surgery        Listed Below are the recommended supplements for Erica-en-Y Gastric By-pass Surgery or laparoscopic Sleeve Gastrectomy. Please be aware you must purchase a 3 month supply of these supplements within the next month after attending your nutrition class and bring these to your History and Physical Appointment.      Approved supplements for use after Erica-en-Y Gastric Bypass Surgery    Vitamins - Select One for usage after surgery:  (a) Optisource Chewable Multi-Vitamin (1 tablet 4 times daily)    (b) Bariatric Fusion Chewable Complete Multi-Vitamin (1 tablet 4 times daily)    (c) Celebrate Chewable Multi-Vitamin (1 tablet 2 times daily) - and -  Celebrate Iron 30 mg + C (1 tablet daily)    (d) Bariatric Advantage Chewable Multi-Formula (1 tablet 2 times daily) - and -   Bariatric Advantage Chewable Iron 29 mg (1 tablet daily)      Calcium - Select One for usage after surgery:  (a) Citracal with Vitamin D - 315 mg calcium citrate per tablet, 250 iu Vitamin D per tablet (1 tablet 5 times daily) - Dissolved in Water    (b) Citracal with Vitamin D Petities - 200 mg calcium citrate per tablet, 250 iu Vitamin D per tablet (2 tablets 4 times daily) - Dissolved in Water    (c) Bariatric Advantage Calcium Lozenges Chewable (1 tablet 3 times daily)    (d) Bariatric Advantage Calcium Chews (1 tablet 6 times daily) - contains calories    (e) Celebrate Calcium Plus 500 (1 tablet 3 times daily)    (f) Calcet Creamy Bites (1 tablet 3 times daily) - contains calories      Laparoscopic Sleeve Gastectomy patients will be required to take Vitamin B12 500 mcgs 1 tablet daily in addition to the supplements listed above      Suggested Post-Operative Vitamin Supplementation Information:   A high-potency vitamin containing at least 200% of the daily value for at least 2/3 of the nutrients.  Begin with chewable or liquid vitamins for the first three months.  Caution liquid vitamins containing iron can stain teeth.  Avoid time-released supplements.  Avoid gel capsules.  Avoid enteric coating.  Choose a complete formula with at least 18 mg of iron, 283VX of folic acid, and contains selenium and zinc in each serving.  Avoid childrens formulas that are incomplete.  Taking vitamins close to food intake may improve gastrointestinal tolerance.  Do separate your dosage of vitamins when taking throughout the day.  Do not mix multivitamin containing iron with calcium supplements, take at least 2 hours apart. Suggested Post-Operative Calcium Supplementation Information:  Calcium supplements are needed due to the great amount of malabsorption that occurs after surgery which, can lead to developing osteoporosis or osteopenia within the first 2 years post-op. A calcium supplement will help maintain bone strength, help your heart pump correctly and aid in the repair of soft tissue. Calcium supplementation is needed lifelong. It is best to always take one dose right before bedtime because calcium is absorbed better when at rest, and another dose around 10:00am and another does around 3:00pm.     ? If you have a predisposition to kidney stones, please talk with your dietitian. Calcium Citrate should block kidney stone formation so this should not be a problem after surgery. If you do not take the correct form of calcium or take the wrong calcium you are more likely to develop kidney stones.     ? Make sure your calcium supplement contains Vitamin D3 at least 800 -1000 iu daily. Spilt calcium into 500-600 mg doses; be mindful of serving size on supplement label. Space doses evenly throughout the day. ? If you are post menopausal and on hormone replacement therapy, please see your dietitian for calcium recommendations. ? If you currently have osteoporosis or osteopenia and are being treated medically (e.g. Actonel, Fosamax), please see your dietitian for calcium recommendations. ? Do not take any of your Calcium within 2 hours of other medications or iron containing multi-vitamins because it may interfere with absorption of the medication. ? Calcium supplements are not a replacement for calcium found within your food. We also recommend in addition to your calcium supplement at least 3 servings of low-fat dairy total daily to meet your calcium requirements. Combined calcium from food and calcium from calcium supplements intake should be greater than 1700 mg. total daily to prevent bone loss during rapid weight loss phase. ? Stay away from Calcium Carbonate. I can not stress this enough after surgery. It is Calcium Citrate we want all patients taking. Do not let supplement shops talk you into something that we do not recommend. It is important in your surgical outcome and overall medical care. ? We Do Not recommend the new Calcium Citrate Crystals  - These are not absorbed well after surgery because they are mainly Calcium Lactate-Gluconate. They are not Calcium Citrate. The company does market them as Calcium Citrate because Calcium Lactate- Gluconate has the same bioavailability as Calcium Citrate but can be made into a drink form. Store Location:  [place], [employer], [employer], [employer] or by calling [phone] or on-line at wwwJingdong. Bariatric Advantage   [phone] or on-line at www.bariatricadvantageWorldOne  Bariatric Fusion   [phone] or on-line at www.bariatricfusion. com  Celebrate    [phone] or on-line at www.ENDOGENX. Diplopia                                                            Types of Protein Supplements:    Whey Protein:   Is the highest quality of protein available. It is a rich source of Branched Chain Amino Acids containing the highest known levels of any natural food source. Types of Whey Protein:  Whey Protein Isolate -  Most amount of protein per serving and   literally zero carbohydrates, lactose and fat. 90% Protein. Highly recommended. Whey Protein Blends -  Blend Whey Protein Concentrate and whey   protein isolate to make a good quality   protein. Do not recommend due to trace     amounts of heavy metals present. Whey Protein Concentrate - cheapest , contain high levels of fat and        lactose ( Not good if you are Lactose        Intolerant) 75% Protein. Do ot recommend      due to trace amounts of heavy metals    present. Casein Protein:  Micellar Casein is a slow digesting and rich protein source that continues to feed your muscles long after whey proteins have dropped off. ( More for Body Builders not a good protein for bariatric patients due to it taking up to seven hours to fully digest)    Egg Protein:  Egg Protein is made from pure egg whites. It is fat-free, fast digesting and rich in Branched Chain Amino Acids and Glutamic Acid. 100% protein. Soy Protein:  Fast digesting form of protein with a solid Branched chain Amino Acid profile. Made from non-animal sources, ideal for Lactose Intolerant and Vegans! Avoid Soy-Protein Isolate Powders. Recommend soy protein without added hormones. Collagen Protein:  Is the component of human connective tissue found in skin, hair and nails and has no Essential Amino Acids to help build protein levels. Avoid. Protein Supplement Drinks Comparisons    We do not endorse these stores or products.  These are only to help you with your lifestyle changes. (All serving sizes are 1 scoop, 1 can or 1 packet)  Product Source Cost  (approx.) Calories Fat  (grams) Protein  (grams) Sugars  (grams)              Rosangela Peel - Whey Protein         [employer] $45.00 110 0 25 0   Yusra Dereck - Egg Protein [employer] $25.00 120 0 24 0   Iso Pure Powder [employer] / [employer]  [employer] $ 40.00 100-105 0 25 0   Nectar Protein [employer]  [employer] $ 30.00 80 0 23 0   BeneProtein [address] $ 42.00     case of 6 25 0 6 0   ProCel and UpCal D  Plus Phone order  [phone] $ 50.00  case of 6 28 0 5 0-1     Bariatric Advantage [phone]   $50.00 150 1.5 - 2.0 27 .5   Bariatric Fusion [phone] $35.00 138 0 27 <1   WheyBolic Extreme 60  [employer] $ 45.00 90 0.5 20 0.5   [employer] $35.00 138 0 27 <1   When choosing a supplement: Do not consume Ensure, Glycerna, Boost or any other high-calorie nutrition supplement on the market. We recommend that the fat content of your protein drink be less than 2 grams of fat, less than 2 grams of sugar and that it be 200 or less calories per serving. We also recommend that you are able to consume enough of your supplement to get your daily protein intake of 60-80 grams. If you are having difficulty getting your protein or are not able to find a supplement, please call your dietitian at [phone].

## 2022-06-10 ENCOUNTER — TELEPHONE (OUTPATIENT)
Dept: BARIATRICS/WEIGHT MGMT | Age: 42
End: 2022-06-10

## 2022-06-10 NOTE — TELEPHONE ENCOUNTER
Tried to [facility] for changing egd for pt but mailbox is full.

## 2022-06-27 ENCOUNTER — TELEPHONE (OUTPATIENT)
Dept: BARIATRICS/WEIGHT MGMT | Age: 42
End: 2022-06-27

## 2022-06-27 NOTE — TELEPHONE ENCOUNTER
Pt called in stating that she needed to r/s some of her appts. Her appt for dietary 7/5 she is asking to be r/s for 7/19 because she is off work. Sita's schedule is full that day but I checked and we can add her onto Misael's. She also needed to r/s her EGD as well.

## 2022-07-05 ENCOUNTER — TELEPHONE (OUTPATIENT)
Dept: BARIATRICS/WEIGHT MGMT | Age: 42
End: 2022-07-05

## 2022-07-11 ENCOUNTER — TELEPHONE (OUTPATIENT)
Dept: BARIATRICS/WEIGHT MGMT | Age: 42
End: 2022-07-11

## 2022-07-11 NOTE — TELEPHONE ENCOUNTER
Canceled egd from 7/14/22, I had called pt on 7/5/22 to reschedule and she has not yet called me back.

## 2022-07-19 ENCOUNTER — TELEPHONE (OUTPATIENT)
Dept: BARIATRICS/WEIGHT MGMT | Age: 42
End: 2022-07-19

## 2022-07-19 NOTE — TELEPHONE ENCOUNTER
Pt was scheduled today at 12:30 for her 3rd diet consult, in-office, but did not show. Called and pt was not available. Left message on voicemail to please call [phone] to reschedule. Note from 7/19/22:  pt called back the next day and left message that her work schedule has changed and therefore she was unable to make the above appt. She plans to call RD on 7/20/22 to reschedule the appt.

## 2022-07-22 ENCOUNTER — TELEPHONE (OUTPATIENT)
Dept: BARIATRICS/WEIGHT MGMT | Age: 42
End: 2022-07-22

## 2022-10-07 ENCOUNTER — TELEPHONE (OUTPATIENT)
Dept: BARIATRICS/WEIGHT MGMT | Age: 42
End: 2022-10-07

## 2022-10-07 NOTE — TELEPHONE ENCOUNTER
SW called PT regarding missed psych eval  appointment but PT was unavailable. Left v-mail requesting that the PT call to reschedule the appointment.      PJ Chavez

## 2022-11-10 ENCOUNTER — APPOINTMENT (OUTPATIENT)
Dept: GENERAL RADIOLOGY | Age: 42
End: 2022-11-10
Payer: COMMERCIAL

## 2022-11-10 ENCOUNTER — HOSPITAL ENCOUNTER (EMERGENCY)
Age: 42
Discharge: HOME | End: 2022-11-10
Attending: EMERGENCY MEDICINE
Payer: COMMERCIAL

## 2022-11-10 VITALS
RESPIRATION RATE: 20 BRPM | SYSTOLIC BLOOD PRESSURE: 149 MMHG | TEMPERATURE: 98 F | WEIGHT: 270 LBS | OXYGEN SATURATION: 94 % | HEIGHT: 67 IN | DIASTOLIC BLOOD PRESSURE: 75 MMHG | BODY MASS INDEX: 42.38 KG/M2 | HEART RATE: 94 BPM

## 2022-11-10 DIAGNOSIS — S93.401A SPRAIN OF RIGHT ANKLE, UNSPECIFIED LIGAMENT, INITIAL ENCOUNTER: Primary | ICD-10-CM

## 2022-11-10 PROCEDURE — 73610 X-RAY EXAM OF ANKLE: CPT

## 2022-11-10 PROCEDURE — 73590 X-RAY EXAM OF LOWER LEG: CPT

## 2022-11-10 PROCEDURE — 99283 EMERGENCY DEPT VISIT LOW MDM: CPT

## 2022-11-10 PROCEDURE — 6370000000 HC RX 637 (ALT 250 FOR IP): Performed by: EMERGENCY MEDICINE

## 2022-11-10 RX ORDER — IBUPROFEN 800 MG/1
800 TABLET ORAL ONCE
Status: COMPLETED | OUTPATIENT
Start: 2022-11-10 | End: 2022-11-10

## 2022-11-10 RX ADMIN — IBUPROFEN 800 MG: 800 TABLET, FILM COATED ORAL at 07:20

## 2022-11-10 ASSESSMENT — ENCOUNTER SYMPTOMS
COLOR CHANGE: 0
BACK PAIN: 0

## 2022-11-10 ASSESSMENT — PAIN DESCRIPTION - LOCATION
LOCATION: ANKLE
LOCATION: ANKLE

## 2022-11-10 ASSESSMENT — PAIN - FUNCTIONAL ASSESSMENT: PAIN_FUNCTIONAL_ASSESSMENT: 0-10

## 2022-11-10 ASSESSMENT — LIFESTYLE VARIABLES: HOW OFTEN DO YOU HAVE A DRINK CONTAINING ALCOHOL: NEVER

## 2022-11-10 ASSESSMENT — PAIN SCALES - GENERAL
PAINLEVEL_OUTOF10: 6
PAINLEVEL_OUTOF10: 8

## 2022-11-10 ASSESSMENT — PAIN DESCRIPTION - DESCRIPTORS: DESCRIPTORS: ACHING

## 2022-11-10 ASSESSMENT — PAIN DESCRIPTION - FREQUENCY: FREQUENCY: CONTINUOUS

## 2022-11-10 ASSESSMENT — PAIN DESCRIPTION - ORIENTATION: ORIENTATION: RIGHT;INNER

## 2022-11-10 NOTE — ED PROVIDER NOTES
Patient presents to the ED with a complaint of ankle pain. Patient states that a heavy door had fallen and this startled her. She jumped backwards and when she did she rolled her right ankle. She now has pain. She states the injury occurred around 730 last night. She was able to work the rest of her shift but they had her sit a lot. She has been able to bear weight and did walk back to her room in the ED. She was evaluated by a nurse there who told her that they do not think anything is broken but she wanted come in to get evaluated anyway. She denies any other injuries. Denies any numbness or weakness in the affected ankle. She did have some pain that radiate up to just below her knee on the outer aspect of her leg. Denies any other injuries. Did not hit her head. Pain is rated 6 out of 10. Has not had anything recently for pain control. Pain worse with weightbearing. Proved with rest.       Review of Systems   Cardiovascular:  Negative for leg swelling. Musculoskeletal:  Positive for arthralgias. Negative for back pain, gait problem, myalgias and neck pain. Skin:  Negative for color change and wound. Neurological:  Negative for weakness and numbness. Physical Exam  Vitals and nursing note reviewed. Constitutional:       General: She is not in acute distress. Appearance: She is well-developed. She is obese. HENT:      Head: Normocephalic and atraumatic. Eyes:      Conjunctiva/sclera: Conjunctivae normal.   Cardiovascular:      Rate and Rhythm: Normal rate. Pulmonary:      Effort: Pulmonary effort is normal.   Musculoskeletal:         General: Swelling (Mild edema noted over the lateral malleolus on the right) and tenderness (Patient has tenderness to palpation over the right lateral malleolus. No right medial malleoli or tenderness. Mild tenderness over the Achilles tendon but the end insertion site for the Achilles tendon is intact. Normal plantar flexion.) present.  No deformity. Cervical back: Normal range of motion and neck supple. Comments: Mild tenderness noted over the right fibular head   Skin:     General: Skin is warm and dry. Findings: No erythema. Neurological:      Mental Status: She is alert and oriented to person, place, and time. Sensory: No sensory deficit (Sensation is grossly intact in all dermatomes of the right foot). Procedures     MDM  Patient presents to the ED for evaluation of an injury to her right ankle. Imaging showed soft tissue swelling without evidence of acute fracture or dislocation. No disruption of the ankle mortise. She will follow-up with her PCP if she has persistent symptoms. Advised to use Ace wrap as well as keep it elevated and apply ice.         --------------------------------------------- PAST HISTORY ---------------------------------------------  Past Medical History:  has a past medical history of Environmental allergies, Morbid obesity due to excess calories ([address] and Thyroid disease. Past Surgical History:  has a past surgical history that includes Tubal ligation; [place] tooth extraction; Colonoscopy; Thyroidectomy (N/A, 7/1/2019); Colonoscopy (N/A, 6/8/2020); and Hemorrhoid surgery (N/A, 6/8/2020). Social History:  reports that she has never smoked. She has never used smokeless tobacco. She reports current alcohol use. She reports that she does not use drugs. Family History: family history is not on file. The patients home medications have been reviewed. Allergies: Seasonal and Lactose intolerance (gi)    -------------------------------------------------- RESULTS -------------------------------------------------  Labs:  No results found for this visit on 11/10/22. Radiology:  XR TIBIA FIBULA RIGHT (2 VIEWS)   Final Result   Ankle soft tissue swelling. Heel spurs. XR ANKLE RIGHT (MIN 3 VIEWS)   Final Result   Ankle soft tissue swelling. Heel spurs. ------------------------- NURSING NOTES AND VITALS REVIEWED ---------------------------  Date / Time Roomed:  11/10/2022  6:52 AM  ED Bed Assignment:  17/17    The nursing notes within the ED encounter and vital signs as below have been reviewed. BP (!) 149/75   Pulse 94   Temp 98 °F (36.7 °C) (Oral)   Resp 20   Ht 5' 7\" (1.702 m)   Wt 270 lb (122.5 kg)   SpO2 94%   BMI 42.29 kg/m²   Oxygen Saturation Interpretation: Normal      ------------------------------------------ PROGRESS NOTES ------------------------------------------  I have spoken with the patient and discussed todays results, in addition to providing specific details for the plan of care and counseling regarding the diagnosis and prognosis. Their questions are answered at this time and they are agreeable with the plan. I discussed at length with them reasons for immediate return here for re evaluation. They will followup with primary care by calling their office tomorrow. --------------------------------- ADDITIONAL PROVIDER NOTES ---------------------------------  At this time the patient is without objective evidence of an acute process requiring hospitalization or inpatient management. They have remained hemodynamically stable throughout their entire ED visit and are stable for discharge with outpatient follow-up. The plan has been discussed in detail and they are aware of the specific conditions for emergent return, as well as the importance of follow-up. New Prescriptions    No medications on file       Diagnosis:  1. Sprain of right ankle, unspecified ligament, initial encounter        Disposition:  Patient's disposition: Discharge to home  Patient's condition is stable.              Natalie Palmer DO  11/10/22 9929

## 2023-03-07 ENCOUNTER — HOSPITAL ENCOUNTER (OUTPATIENT)
Dept: ULTRASOUND IMAGING | Age: 43
Discharge: HOME | End: 2023-03-07
Payer: COMMERCIAL

## 2023-03-07 DIAGNOSIS — R10.2 SUPRAPUBIC PAIN: ICD-10-CM

## 2023-03-07 PROCEDURE — 76770 US EXAM ABDO BACK WALL COMP: CPT

## 2023-05-26 ENCOUNTER — HOSPITAL ENCOUNTER (OUTPATIENT)
Dept: MAMMOGRAPHY | Age: 43
End: 2023-05-26
Payer: COMMERCIAL

## 2023-05-26 DIAGNOSIS — Z12.31 ENCOUNTER FOR SCREENING MAMMOGRAM FOR MALIGNANT NEOPLASM OF BREAST: ICD-10-CM

## 2023-05-26 PROCEDURE — 77063 BREAST TOMOSYNTHESIS BI: CPT

## 2023-05-30 ENCOUNTER — TELEPHONE (OUTPATIENT)
Dept: MAMMOGRAPHY | Age: 43
End: 2023-05-30

## 2023-05-30 NOTE — TELEPHONE ENCOUNTER
Order request faxed to Silviano Oro NP for left breast diagnostic mammogram and limited ultrasound as recommended from mammogram performed at [address] [place] [address],[address] on 5/26/23. Successful fax confirmation.  TAB LewisN, RN -Breast Navigator

## 2023-05-31 ENCOUNTER — TELEPHONE (OUTPATIENT)
Dept: MAMMOGRAPHY | Age: 43
End: 2023-05-31

## 2023-05-31 NOTE — TELEPHONE ENCOUNTER
Call to patient in reference to her mammogram performed at [facility] on 5/26/23. Instructed patient that the radiologist has recommended additional breast imaging in order to make a final determination and further recommendations. Patient verbalized understanding and is agreeable to proceed at [facility]. Orders received per Kevin Haney NP and scanned into media.  Patient scheduled for 6/8/23 at 1:30 pm. MADDIE Richter, RN -Breast Navigator

## 2023-06-08 ENCOUNTER — HOSPITAL ENCOUNTER (OUTPATIENT)
Dept: ULTRASOUND IMAGING | Age: 43
Discharge: HOME | End: 2023-06-08
Payer: COMMERCIAL

## 2023-06-08 ENCOUNTER — HOSPITAL ENCOUNTER (OUTPATIENT)
Dept: MAMMOGRAPHY | Age: 43
Discharge: HOME | End: 2023-06-10
Payer: COMMERCIAL

## 2023-06-08 DIAGNOSIS — N64.89 BREAST ASYMMETRY: ICD-10-CM

## 2023-06-08 DIAGNOSIS — R92.8 ABNORMAL MAMMOGRAM: ICD-10-CM

## 2023-06-08 PROCEDURE — 76642 ULTRASOUND BREAST LIMITED: CPT

## 2023-06-08 PROCEDURE — G0279 TOMOSYNTHESIS, MAMMO: HCPCS

## 2023-09-14 ENCOUNTER — HOSPITAL ENCOUNTER (OUTPATIENT)
Dept: GENERAL RADIOLOGY | Age: 43
Discharge: HOME | End: 2023-09-16
Payer: COMMERCIAL

## 2023-09-14 DIAGNOSIS — R31.29 MICROSCOPIC HEMATURIA: ICD-10-CM

## 2023-09-14 PROCEDURE — 74455 X-RAY URETHRA/BLADDER: CPT

## 2023-10-18 ENCOUNTER — HOSPITAL ENCOUNTER (EMERGENCY)
Age: 43
Discharge: HOME | End: 2023-10-18

## 2023-10-18 VITALS
HEART RATE: 92 BPM | OXYGEN SATURATION: 100 % | SYSTOLIC BLOOD PRESSURE: 171 MMHG | DIASTOLIC BLOOD PRESSURE: 81 MMHG | RESPIRATION RATE: 20 BRPM | BODY MASS INDEX: 43.07 KG/M2 | TEMPERATURE: 98.3 F | WEIGHT: 275 LBS

## 2023-10-18 DIAGNOSIS — S80.852A FOREIGN BODY IN LEFT LOWER EXTREMITY, INITIAL ENCOUNTER: Primary | ICD-10-CM

## 2023-10-18 PROCEDURE — 6370000000 HC RX 637 (ALT 250 FOR IP): Performed by: PHYSICIAN ASSISTANT

## 2023-10-18 PROCEDURE — 10120 INC&RMVL FB SUBQ TISS SMPL: CPT

## 2023-10-18 PROCEDURE — 99283 EMERGENCY DEPT VISIT LOW MDM: CPT

## 2023-10-18 RX ORDER — BACITRACIN ZINC 500 [USP'U]/G
OINTMENT TOPICAL ONCE
Status: COMPLETED | OUTPATIENT
Start: 2023-10-18 | End: 2023-10-18

## 2023-10-18 RX ADMIN — BACITRACIN ZINC: 500 OINTMENT TOPICAL at 20:41

## 2023-10-18 ASSESSMENT — PAIN - FUNCTIONAL ASSESSMENT: PAIN_FUNCTIONAL_ASSESSMENT: NONE - DENIES PAIN

## 2023-10-18 ASSESSMENT — LIFESTYLE VARIABLES: HOW MANY STANDARD DRINKS CONTAINING ALCOHOL DO YOU HAVE ON A TYPICAL DAY: PATIENT DOES NOT DRINK

## 2023-10-19 NOTE — ED NOTES
Wound cleaned and dressed with bacitracin and nonstick gauze. Home care and d/c instructions provided and no questions/concerns expressed at this time.       Melania Martin RN  10/18/23 2034

## 2024-07-30 ENCOUNTER — TRANSCRIBE ORDERS (OUTPATIENT)
Dept: ADMINISTRATIVE | Age: 44
End: 2024-07-30

## 2024-07-30 DIAGNOSIS — R92.30 DENSE BREASTS: Primary | ICD-10-CM

## 2024-08-19 ENCOUNTER — HOSPITAL ENCOUNTER (OUTPATIENT)
Dept: ULTRASOUND IMAGING | Age: 44
Discharge: HOME | End: 2024-08-19
Payer: COMMERCIAL

## 2024-08-19 DIAGNOSIS — R92.30 DENSE BREASTS: ICD-10-CM

## 2024-08-19 PROCEDURE — 76641 ULTRASOUND BREAST COMPLETE: CPT

## 2025-01-13 ENCOUNTER — HOSPITAL ENCOUNTER (OUTPATIENT)
Dept: MAMMOGRAPHY | Age: 45
Discharge: HOME | End: 2025-01-15
Attending: OBSTETRICS & GYNECOLOGY
Payer: COMMERCIAL

## 2025-01-13 DIAGNOSIS — Z12.31 VISIT FOR SCREENING MAMMOGRAM: ICD-10-CM

## 2025-01-13 PROCEDURE — 77063 BREAST TOMOSYNTHESIS BI: CPT

## 2025-02-21 ENCOUNTER — HOSPITAL ENCOUNTER (OUTPATIENT)
Age: 45
Discharge: HOME | End: 2025-02-23
Payer: COMMERCIAL

## 2025-02-21 ENCOUNTER — HOSPITAL ENCOUNTER (OUTPATIENT)
Dept: GENERAL RADIOLOGY | Age: 45
Discharge: HOME | End: 2025-02-23
Payer: COMMERCIAL

## 2025-02-21 DIAGNOSIS — R14.0 BLOATING: ICD-10-CM

## 2025-02-21 PROCEDURE — 74018 RADEX ABDOMEN 1 VIEW: CPT

## (undated) DEVICE — JP CHAN DRN SIL RND 15FR FULL W/TRO: Brand: JACKSON-PRATT

## (undated) DEVICE — INTENDED FOR TISSUE SEPARATION, AND OTHER PROCEDURES THAT REQUIRE A SHARP SURGICAL BLADE TO PUNCTURE OR CUT.: Brand: BARD-PARKER ® STAINLESS STEEL BLADES

## (undated) DEVICE — GOWN,SIRUS,FABRNF,2XL,18/CS: Brand: MEDLINE

## (undated) DEVICE — PACK PROCEDURE SURG GEN CUST

## (undated) DEVICE — MAGNETIC INSTR DRAPE 20X16: Brand: MEDLINE INDUSTRIES, INC.

## (undated) DEVICE — READY WET SKIN SCRUB TRAY-LF: Brand: MEDLINE INDUSTRIES, INC.

## (undated) DEVICE — CORD,CAUTERY,BIPOLAR,STERILE: Brand: MEDLINE

## (undated) DEVICE — SPONGE,LAP,4"X18",XR,ST,5/PK,40PK/CS: Brand: MEDLINE INDUSTRIES, INC.

## (undated) DEVICE — LUBRICANT SURG JELLY ST BACTER TUBE 4.25OZ

## (undated) DEVICE — CONTROL SYRINGE LUER-LOCK TIP: Brand: MONOJECT

## (undated) DEVICE — KIT BEDSIDE REVITAL OX 500ML

## (undated) DEVICE — MASK,FACE,MAXFLUIDPROTECT,SHIELD/ERLPS: Brand: MEDLINE

## (undated) DEVICE — TUBING SUCT 12FR MAL ALUM SHFT FN CAP VENT UNIV CONN W/ OBT

## (undated) DEVICE — VALVE SUCTION AIR H2O HYDR H2O JET CONN STRL ORCA POD + DISP

## (undated) DEVICE — E-Z CLEAN, NON-STICK, PTFE COATED, ELECTROSURGICAL NEEDLE ELECTRODE, MODIFIED EXTENDED INSULATION, 2.75 INCH (7 CM): Brand: MEGADYNE

## (undated) DEVICE — 6 X 9  1.75MIL 4-WALL LABGUARD: Brand: MINIGRIP COMMERCIAL LLC

## (undated) DEVICE — Device

## (undated) DEVICE — SHEARS ENDOSCP L9CM CRV HARM FOCS +

## (undated) DEVICE — SOLUTION IV IRRIG POUR BRL 0.9% SODIUM CHL 2F7124

## (undated) DEVICE — 4-PORT MANIFOLD: Brand: NEPTUNE 2

## (undated) DEVICE — CONTAINER SPEC COLL 960ML POLYPR TRIANG GRAD INTAKE/OUTPUT

## (undated) DEVICE — KENDALL 450 SERIES MONITORING FOAM ELECTRODE - RECTANGULAR SHAPE ( 3/PK): Brand: KENDALL

## (undated) DEVICE — COVER HNDL LT DISP

## (undated) DEVICE — EMG TUBE 8229707 NIM TRIVANTAGE 7.0MM ID: Brand: NIM TRIVANTAGE™

## (undated) DEVICE — FORCEPS BPLR FOR GRD I AND II INT HEMORRHOID HET

## (undated) DEVICE — TOWEL,OR,DSP,ST,BLUE,STD,6/PK,12PK/CS: Brand: MEDLINE

## (undated) DEVICE — MEDI-VAC NON-CONDUCTIVE SUCTION TUBING: Brand: CARDINAL HEALTH

## (undated) DEVICE — SPONGE,PEANUT,XRAY,ST,SM,3/8",5/CARD: Brand: MEDLINE INDUSTRIES, INC.

## (undated) DEVICE — STANDARD HYPODERMIC NEEDLE,POLYPROPYLENE HUB: Brand: MONOJECT

## (undated) DEVICE — SPONGE GZ 4IN 4IN 4 PLY N WVN AVANT

## (undated) DEVICE — PROBE 8225101 5PK STD PRASS FL TIP ROHS

## (undated) DEVICE — GOWN ISOLATN REG YEL M WT MULTIPLY SIDETIE LEV 2

## (undated) DEVICE — DRAIN SURG W10MMXL20CM SIL FULL PERF HUBLESS FLAT RADPQ

## (undated) DEVICE — GAUZE,SPONGE,4"X4",16PLY,XRAY,STRL,LF: Brand: MEDLINE

## (undated) DEVICE — DRAIN SURG 15FR SIL RND CHN W/ TRCR FULL FLUT DBL WRP TRAD

## (undated) DEVICE — PATIENT RETURN ELECTRODE, SINGLE-USE, CONTACT QUALITY MONITORING, ADULT, WITH 9FT CORD, FOR PATIENTS WEIGING OVER 33LBS. (15KG): Brand: MEGADYNE

## (undated) DEVICE — DOUBLE BASIN SET: Brand: MEDLINE INDUSTRIES, INC.

## (undated) DEVICE — AGENT HEMSTAT 3GM OXIDIZED REGENERATED CELOS ABSRB FOR CONT (ORDER MULTIPLES OF 5EA)

## (undated) DEVICE — SURGICAL PROCEDURE PACK EENT CUST